# Patient Record
Sex: FEMALE | Race: BLACK OR AFRICAN AMERICAN | Employment: UNEMPLOYED | URBAN - METROPOLITAN AREA
[De-identification: names, ages, dates, MRNs, and addresses within clinical notes are randomized per-mention and may not be internally consistent; named-entity substitution may affect disease eponyms.]

---

## 2023-12-25 ENCOUNTER — HOSPITAL ENCOUNTER (EMERGENCY)
Age: 63
Discharge: HOME | End: 2023-12-26
Attending: EMERGENCY MEDICINE
Payer: MEDICARE

## 2023-12-25 VITALS
WEIGHT: 209.2 LBS | HEIGHT: 65 IN | SYSTOLIC BLOOD PRESSURE: 168 MMHG | TEMPERATURE: 98.2 F | BODY MASS INDEX: 34.85 KG/M2 | OXYGEN SATURATION: 97 % | RESPIRATION RATE: 20 BRPM | DIASTOLIC BLOOD PRESSURE: 108 MMHG | HEART RATE: 65 BPM

## 2023-12-25 DIAGNOSIS — I10 ASYMPTOMATIC HYPERTENSION: Primary | ICD-10-CM

## 2023-12-25 PROCEDURE — 6370000000 HC RX 637 (ALT 250 FOR IP): Performed by: STUDENT IN AN ORGANIZED HEALTH CARE EDUCATION/TRAINING PROGRAM

## 2023-12-25 PROCEDURE — 99283 EMERGENCY DEPT VISIT LOW MDM: CPT

## 2023-12-25 RX ORDER — ATENOLOL 25 MG/1
25 TABLET ORAL ONCE
Status: COMPLETED | OUTPATIENT
Start: 2023-12-25 | End: 2023-12-25

## 2023-12-25 RX ORDER — HYDROCHLOROTHIAZIDE 25 MG/1
25 TABLET ORAL ONCE
Status: COMPLETED | OUTPATIENT
Start: 2023-12-25 | End: 2023-12-25

## 2023-12-25 RX ORDER — ACETAMINOPHEN 325 MG/1
650 TABLET ORAL ONCE
Status: COMPLETED | OUTPATIENT
Start: 2023-12-25 | End: 2023-12-25

## 2023-12-25 RX ADMIN — HYDROCHLOROTHIAZIDE 25 MG: 25 TABLET ORAL at 22:20

## 2023-12-25 RX ADMIN — ACETAMINOPHEN 650 MG: 325 TABLET ORAL at 22:19

## 2023-12-25 RX ADMIN — ATENOLOL 25 MG: 25 TABLET ORAL at 22:20

## 2023-12-26 RX ORDER — HYDROCHLOROTHIAZIDE 25 MG/1
25 TABLET ORAL DAILY
Qty: 30 TABLET | Refills: 0 | Status: SHIPPED | OUTPATIENT
Start: 2023-12-26

## 2023-12-26 RX ORDER — ATENOLOL 25 MG/1
25 TABLET ORAL DAILY
Qty: 30 TABLET | Refills: 0 | Status: SHIPPED | OUTPATIENT
Start: 2023-12-26

## 2023-12-26 RX ORDER — HYDROCHLOROTHIAZIDE 25 MG/1
25 TABLET ORAL DAILY
Qty: 30 TABLET | Refills: 0 | Status: SHIPPED | OUTPATIENT
Start: 2023-12-26 | End: 2023-12-26

## 2023-12-26 NOTE — ED PROVIDER NOTES
[facility]          EM RESIDENT NOTE       Date of evaluation: 12/25/2023    Chief Complaint     Hypertension (Pt complains of hypertension. Pt has hx of hypertension but ran out of her meds two weeks ago. On arrival pt bp is 170/90. Pt complains of a headache starting two days ago.)    History of Present Illness     Chrissie Ann is a 70 y.o. female with PMHx of HTN, CAD, RA, chronic pain, and depression who presents with elevated blood pressures. Patient reports that she is been out of her antihypertensives and therefore has had elevated blood pressure readings for the past 1 to 2 weeks. She reports checking her blood pressures at home and they have been ranging anywhere between systolics of 401Q to 629F. Patient reports that when she is taking her antihypertensives, her blood pressure is very well-controlled with systolics in the 027X to 018P. Denies any associated symptoms including lightheadedness, dizziness, acute onset/worsening headaches, vision changes, cough, shortness of breath, chest pain or discomfort, palpitations, nausea, vomiting, abdominal pain, numbness or tingling in her extremities, or any focal weakness. Patient reports that she been having some insurance issues and therefore has missed out on her medications. Reports seeing her PCP a couple months ago. Review of Systems     A complete ROS was performed and is negative unless stated above. Past Medical, Surgical, Family, and Social History     She has a past medical history of CAD (coronary artery disease), Depression, DJD (degenerative joint disease), and Hypertension. She has a past surgical history that includes knee surgery; angioplasty; and Hysterectomy. Her family history is not on file. She reports that she has never smoked. She has never used smokeless tobacco. She reports current alcohol use. She reports that she does not use drugs.     Medications     Current Discharge Medication

## 2023-12-26 NOTE — DISCHARGE INSTRUCTIONS
You were seen in the emergency department for elevated blood pressures. As you were out of your home medications, you were given a dose of your home medications in the emergency department. As discussed, please take these medications as prescribed at home and please follow-up with your PCP. Return to the emergency department if you experience any of the following: Your blood pressure is much higher than normal (such as 180/120 or higher), but you don't have symptoms. You think high blood pressure is causing symptoms, such as:  Severe headache. Blurry vision.

## 2023-12-26 NOTE — ED PROVIDER NOTES
ED Attending Attestation Note     Date of evaluation: 12/25/2023    This patient was seen by the resident. I have seen and examined the patient, agree with the workup, evaluation, management and diagnosis. The care plan has been discussed. I have reviewed the ECG and concur with the resident's interpretation. My assessment reveals a 70 y.o. female with elevated blood pressure. Has very mild headache but otherwise asymptomatic. On exam she is well appearing, moves all four extremities and follows commands. Lungs are clear. Abdomen is soft and nontender. No indication for acute lowering of BP in ED. Will increase HCTZ dose and refer to PCP.          Gennett Leventhal, MD  12/26/23 1002

## 2024-12-27 ENCOUNTER — HOSPITAL ENCOUNTER (EMERGENCY)
Age: 64
Discharge: HOME | End: 2024-12-27
Attending: EMERGENCY MEDICINE
Payer: MEDICARE

## 2024-12-27 ENCOUNTER — APPOINTMENT (OUTPATIENT)
Dept: GENERAL RADIOLOGY | Age: 64
End: 2024-12-27
Payer: MEDICARE

## 2024-12-27 VITALS
SYSTOLIC BLOOD PRESSURE: 145 MMHG | HEIGHT: 65 IN | BODY MASS INDEX: 33.99 KG/M2 | TEMPERATURE: 98.8 F | OXYGEN SATURATION: 95 % | DIASTOLIC BLOOD PRESSURE: 88 MMHG | HEART RATE: 75 BPM | RESPIRATION RATE: 16 BRPM | WEIGHT: 204 LBS

## 2024-12-27 DIAGNOSIS — M79.662 PAIN OF LEFT CALF: Primary | ICD-10-CM

## 2024-12-27 LAB
AMORPH SED URNS QL MICRO: ABNORMAL /HPF
ANION GAP SERPL CALCULATED.3IONS-SCNC: 14 MMOL/L (ref 3–16)
BACTERIA URNS QL MICRO: ABNORMAL /HPF
BASOPHILS # BLD: 0 K/UL (ref 0–0.2)
BASOPHILS NFR BLD: 0.4 %
BILIRUB UR QL STRIP.AUTO: NEGATIVE
BUN SERPL-MCNC: 22 MG/DL (ref 7–20)
CALCIUM SERPL-MCNC: 10.3 MG/DL (ref 8.3–10.6)
CHLORIDE SERPL-SCNC: 100 MMOL/L (ref 99–110)
CLARITY UR: CLEAR
CO2 SERPL-SCNC: 24 MMOL/L (ref 21–32)
COLOR UR: YELLOW
CREAT SERPL-MCNC: 0.8 MG/DL (ref 0.6–1.2)
D-DIMER QUANTITATIVE: 2 UG/ML FEU (ref 0–0.6)
DEPRECATED RDW RBC AUTO: 13.8 % (ref 12.4–15.4)
EOSINOPHIL # BLD: 0.1 K/UL (ref 0–0.6)
EOSINOPHIL NFR BLD: 1.1 %
EPI CELLS #/AREA URNS HPF: ABNORMAL /HPF (ref 0–5)
GFR SERPLBLD CREATININE-BSD FMLA CKD-EPI: 81 ML/MIN/{1.73_M2}
GLUCOSE SERPL-MCNC: 117 MG/DL (ref 70–99)
GLUCOSE UR STRIP.AUTO-MCNC: NEGATIVE MG/DL
HCT VFR BLD AUTO: 39.9 % (ref 36–48)
HGB BLD-MCNC: 13.1 G/DL (ref 12–16)
HGB UR QL STRIP.AUTO: NEGATIVE
KETONES UR STRIP.AUTO-MCNC: NEGATIVE MG/DL
LEUKOCYTE ESTERASE UR QL STRIP.AUTO: ABNORMAL
LYMPHOCYTES # BLD: 2 K/UL (ref 1–5.1)
LYMPHOCYTES NFR BLD: 19.5 %
MAGNESIUM SERPL-MCNC: 2 MG/DL (ref 1.8–2.4)
MCH RBC QN AUTO: 29 PG (ref 26–34)
MCHC RBC AUTO-ENTMCNC: 32.9 G/DL (ref 31–36)
MCV RBC AUTO: 88 FL (ref 80–100)
MONOCYTES # BLD: 0.9 K/UL (ref 0–1.3)
MONOCYTES NFR BLD: 9.4 %
NEUTROPHILS # BLD: 7 K/UL (ref 1.7–7.7)
NEUTROPHILS NFR BLD: 69.6 %
NITRITE UR QL STRIP.AUTO: NEGATIVE
PH UR STRIP.AUTO: 6 [PH] (ref 5–8)
PLATELET # BLD AUTO: 255 K/UL (ref 135–450)
PMV BLD AUTO: 9.7 FL (ref 5–10.5)
POTASSIUM SERPL-SCNC: 3.2 MMOL/L (ref 3.5–5.1)
PROT UR STRIP.AUTO-MCNC: NEGATIVE MG/DL
RBC # BLD AUTO: 4.54 M/UL (ref 4–5.2)
RBC #/AREA URNS HPF: ABNORMAL /HPF (ref 0–4)
RENAL EPI CELLS #/AREA UR COMP ASSIST: ABNORMAL /HPF (ref 0–1)
SODIUM SERPL-SCNC: 138 MMOL/L (ref 136–145)
SP GR UR STRIP.AUTO: 1.01 (ref 1–1.03)
UA COMPLETE W REFLEX CULTURE PNL UR: ABNORMAL
UA DIPSTICK W REFLEX MICRO PNL UR: YES
URN SPEC COLLECT METH UR: ABNORMAL
UROBILINOGEN UR STRIP-ACNC: 0.2 E.U./DL
WBC # BLD AUTO: 10 K/UL (ref 4–11)
WBC #/AREA URNS HPF: ABNORMAL /HPF (ref 0–5)

## 2024-12-27 PROCEDURE — 81001 URINALYSIS AUTO W/SCOPE: CPT

## 2024-12-27 PROCEDURE — 85025 COMPLETE CBC W/AUTO DIFF WBC: CPT

## 2024-12-27 PROCEDURE — 83735 ASSAY OF MAGNESIUM: CPT

## 2024-12-27 PROCEDURE — 6370000000 HC RX 637 (ALT 250 FOR IP)

## 2024-12-27 PROCEDURE — 85379 FIBRIN DEGRADATION QUANT: CPT

## 2024-12-27 PROCEDURE — 80048 BASIC METABOLIC PNL TOTAL CA: CPT

## 2024-12-27 PROCEDURE — 99284 EMERGENCY DEPT VISIT MOD MDM: CPT

## 2024-12-27 PROCEDURE — 73562 X-RAY EXAM OF KNEE 3: CPT

## 2024-12-27 RX ORDER — POTASSIUM CHLORIDE 1500 MG/1
40 TABLET, EXTENDED RELEASE ORAL ONCE
Status: COMPLETED | OUTPATIENT
Start: 2024-12-27 | End: 2024-12-27

## 2024-12-27 RX ORDER — IBUPROFEN 400 MG/1
800 TABLET, FILM COATED ORAL ONCE
Status: COMPLETED | OUTPATIENT
Start: 2024-12-27 | End: 2024-12-27

## 2024-12-27 RX ORDER — ACETAMINOPHEN 500 MG
1000 TABLET ORAL
Status: COMPLETED | OUTPATIENT
Start: 2024-12-27 | End: 2024-12-27

## 2024-12-27 RX ADMIN — POTASSIUM CHLORIDE 40 MEQ: 1500 TABLET, EXTENDED RELEASE ORAL at 23:34

## 2024-12-27 RX ADMIN — IBUPROFEN 800 MG: 400 TABLET, FILM COATED ORAL at 21:01

## 2024-12-27 RX ADMIN — ACETAMINOPHEN 1000 MG: 500 TABLET, FILM COATED ORAL at 21:01

## 2024-12-27 RX ADMIN — APIXABAN 10 MG: 5 TABLET, FILM COATED ORAL at 23:35

## 2024-12-27 ASSESSMENT — PAIN - FUNCTIONAL ASSESSMENT: PAIN_FUNCTIONAL_ASSESSMENT: 0-10

## 2024-12-27 ASSESSMENT — PAIN SCALES - GENERAL: PAINLEVEL_OUTOF10: 8

## 2024-12-27 ASSESSMENT — PAIN DESCRIPTION - LOCATION: LOCATION: LEG

## 2024-12-27 ASSESSMENT — LIFESTYLE VARIABLES
HOW OFTEN DO YOU HAVE A DRINK CONTAINING ALCOHOL: NEVER
HOW MANY STANDARD DRINKS CONTAINING ALCOHOL DO YOU HAVE ON A TYPICAL DAY: PATIENT DOES NOT DRINK

## 2024-12-27 ASSESSMENT — PAIN DESCRIPTION - ORIENTATION: ORIENTATION: LEFT

## 2024-12-28 NOTE — ED PROVIDER NOTES
ED Attending Attestation Note     Date of evaluation: 12/27/2024    This patient was seen by the resident.  I have seen and examined the patient, agree with the workup, evaluation, management and diagnosis. The care plan has been discussed.  I have reviewed the ECG and concur with the resident's interpretation.  My assessment reveals an overall well-appearing older patient, pleasantly conversational, in no acute distress.  She presents to the emergency department reportedly at the behest of her PCPs office due to concern for symptoms potentially caused by a UTI.  Patient states that over the past 2 days or so, she has had significant pain that she describes as being across her low lumbar/sacral back, somewhat worse on the left than the right, radiating somewhat into the left buttock and thigh on the left.  In that same time.  She has also been experiencing pain and swelling of the left knee.  She does not recall any antecedent strain or trauma.  The pain in her back and knee have been making it difficult for her to get around, although she is still able to ambulate with the assistance of a cane, she states that she is moving a good bit slower than is typical for her.  As a result, she states that she has been having difficulty getting to the bathroom in time when she feels the urge to urinate, and has urinated on herself as a result.  She states that she does strongly feel the urge to urinate, just feels that she is not able to get to the bathroom in time.  Feels that she is urinating with some greater frequency, although denies hesitancy, unusual urgency, or any dysuria, or abdominal pain.  On examination, patient does have diffuse discomfort to palpation across the low lumbar back, somewhat more prominent in the left paraspinous region and left buttock.  No CVA tenderness to percussion.  No abdominal tenderness.  On examination of the left leg, the patient is noted to have anterior bilateral scars of total knee

## 2024-12-28 NOTE — ED PROVIDER NOTES
THE [facility]  EMERGENCY DEPARTMENT ENCOUNTER          EM RESIDENT NOTE       Date of evaluation: 12/27/2024    Chief Complaint     Flank Pain, Leg Pain, and Back Pain      History of Present Illness     Zulay Louis is a 64 y.o. female with a past medical history HTN, CAD, bilateral  TKA who presents with left knee pain and urinary urgency and urge incontinence for the last 2-3 days. She also complains of bilateral low back pain that is worse with position. She notes that her left knee has been swollen and painful for a similar period with new swelling in her left calf and left calf pain. She denies any fevers, dysuria, polyuria, or change in appearance of her urine. Denies saddle incontinence or numbness/weakness in any extremity. She states she knows she has to go but just cannot make it to the bathroom in time. She denies any known history of DVT/PE or recent travel.    Other than stated above, no additional associated symptoms or aggravating/alleviating factors are noted.    MEDICAL DECISION MAKING / ASSESSMENT / PLAN     INITIAL VITALS: BP: (!) 145/88, Temp: 98.8 °F (37.1 °C), Pulse: 75, Respirations: 16, SpO2: 95 %    Nursing Notes, Past Medical Hx, Past Surgical Hx, Social Hx, Allergies, and Family Hx were reviewed.    Upon presentation, the patient was non-toxic appearing resting comfortably in no acute distress. Vital signs are reassuring, hemodynamically stable, not tachycardic or tachypneic, normoxic on room air, afebrile. On exam, she has bilateral lumbar paraspinal TTP without any midline TTP. No CVAT. No neurologic deficits on exam. Her left knee is swollen with joint line tenderness and the swelling extends to her left calf which is slightly larger than the right. There is no warmth, erythema, fluctuance, or induration. Her left calf is tender to palpation without any palpable cords. She has pain with flexion past 30 degrees but has no pain with passive flexion or extension from fully

## 2024-12-28 NOTE — DISCHARGE INSTRUCTIONS
There is no evidence of a urinary tract infection. We will send a urine culture and call you if it is abnormal, but right now it does not need treatment. Monitor for any UTI symptoms such as pain when urinating or fevers.    We are starting you on a blood thinner for a possible blood clot in your left leg. We will obtain an ultrasound to evaluate this as an outpatient. If the ultrasound is negative, you can stop the blood thinner. I am only prescribing a 4 day course of this so if there is a blood clot you will need a longer prescription.    If you have been told you need a Doppler Test to look for a blood clot:    Contact the Scheduling Department on the next business day at [phone] to schedule your vascular study. Let the  know you were seen in the Emergency Room.  You must bring a copy of your ordered exam with you to the Hospital for your scheduled appointment.  Scheduling will contact the vascular department if there are any questions or issues.

## 2025-01-27 ENCOUNTER — HOSPITAL ENCOUNTER (OUTPATIENT)
Dept: VASCULAR LAB | Age: 65
Discharge: HOME | End: 2025-01-29
Payer: MEDICARE

## 2025-01-27 DIAGNOSIS — M79.662 PAIN OF LEFT CALF: ICD-10-CM

## 2025-01-27 PROCEDURE — 93971 EXTREMITY STUDY: CPT | Performed by: SURGERY

## 2025-01-27 PROCEDURE — 93971 EXTREMITY STUDY: CPT

## 2025-03-18 ENCOUNTER — HOSPITAL ENCOUNTER (INPATIENT)
Age: 65
LOS: 4 days | Discharge: HOME | End: 2025-03-22
Attending: EMERGENCY MEDICINE | Admitting: INTERNAL MEDICINE
Payer: MEDICARE

## 2025-03-18 ENCOUNTER — APPOINTMENT (OUTPATIENT)
Dept: GENERAL RADIOLOGY | Age: 65
End: 2025-03-18
Payer: MEDICARE

## 2025-03-18 ENCOUNTER — APPOINTMENT (OUTPATIENT)
Dept: CT IMAGING | Age: 65
End: 2025-03-18
Payer: MEDICARE

## 2025-03-18 DIAGNOSIS — E87.6 HYPOKALEMIA: ICD-10-CM

## 2025-03-18 DIAGNOSIS — R11.2 NAUSEA AND VOMITING, UNSPECIFIED VOMITING TYPE: Primary | ICD-10-CM

## 2025-03-18 DIAGNOSIS — I95.9 HYPOTENSION, UNSPECIFIED HYPOTENSION TYPE: ICD-10-CM

## 2025-03-18 LAB
ALBUMIN SERPL-MCNC: 3.7 G/DL (ref 3.4–5)
ALBUMIN/GLOB SERPL: 0.9 {RATIO} (ref 1.1–2.2)
ALP SERPL-CCNC: 88 U/L (ref 40–129)
ALT SERPL-CCNC: 7 U/L (ref 10–40)
ANION GAP SERPL CALCULATED.3IONS-SCNC: 22 MMOL/L (ref 3–16)
ANION GAP SERPL CALCULATED.3IONS-SCNC: 23 MMOL/L (ref 3–16)
AST SERPL-CCNC: 29 U/L (ref 15–37)
BASOPHILS # BLD: 0.1 K/UL (ref 0–0.2)
BASOPHILS NFR BLD: 0.8 %
BILIRUB SERPL-MCNC: 0.8 MG/DL (ref 0–1)
BILIRUB UR QL STRIP.AUTO: ABNORMAL
BUN SERPL-MCNC: 12 MG/DL (ref 7–20)
BUN SERPL-MCNC: 12 MG/DL (ref 7–20)
CALCIUM SERPL-MCNC: 10.4 MG/DL (ref 8.3–10.6)
CALCIUM SERPL-MCNC: 11.1 MG/DL (ref 8.3–10.6)
CHLORIDE SERPL-SCNC: 92 MMOL/L (ref 99–110)
CHLORIDE SERPL-SCNC: 93 MMOL/L (ref 99–110)
CLARITY UR: CLEAR
CO2 SERPL-SCNC: 24 MMOL/L (ref 21–32)
CO2 SERPL-SCNC: 24 MMOL/L (ref 21–32)
COLOR UR: YELLOW
CREAT SERPL-MCNC: 1 MG/DL (ref 0.6–1.2)
CREAT SERPL-MCNC: 1 MG/DL (ref 0.6–1.2)
DEPRECATED RDW RBC AUTO: 15.7 % (ref 12.4–15.4)
EKG ATRIAL RATE: 57 BPM
EKG DIAGNOSIS: NORMAL
EKG P AXIS: 50 DEGREES
EKG P-R INTERVAL: 172 MS
EKG Q-T INTERVAL: 486 MS
EKG QRS DURATION: 92 MS
EKG QTC CALCULATION (BAZETT): 473 MS
EKG R AXIS: 23 DEGREES
EKG T AXIS: -26 DEGREES
EKG VENTRICULAR RATE: 57 BPM
EOSINOPHIL # BLD: 0.1 K/UL (ref 0–0.6)
EOSINOPHIL NFR BLD: 1.3 %
FLUAV RNA RESP QL NAA+PROBE: NOT DETECTED
FLUBV RNA RESP QL NAA+PROBE: NOT DETECTED
GFR SERPLBLD CREATININE-BSD FMLA CKD-EPI: 62 ML/MIN/{1.73_M2}
GFR SERPLBLD CREATININE-BSD FMLA CKD-EPI: 62 ML/MIN/{1.73_M2}
GLUCOSE SERPL-MCNC: 81 MG/DL (ref 70–99)
GLUCOSE SERPL-MCNC: 82 MG/DL (ref 70–99)
GLUCOSE UR STRIP.AUTO-MCNC: NEGATIVE MG/DL
HCT VFR BLD AUTO: 45.1 % (ref 36–48)
HGB BLD-MCNC: 14.9 G/DL (ref 12–16)
HGB UR QL STRIP.AUTO: NEGATIVE
KETONES UR STRIP.AUTO-MCNC: 40 MG/DL
LACTATE BLDV-SCNC: 1 MMOL/L (ref 0.4–2)
LEUKOCYTE ESTERASE UR QL STRIP.AUTO: NEGATIVE
LIPASE SERPL-CCNC: 22 U/L (ref 13–60)
LYMPHOCYTES # BLD: 2.6 K/UL (ref 1–5.1)
LYMPHOCYTES NFR BLD: 30.4 %
MAGNESIUM SERPL-MCNC: 1.97 MG/DL (ref 1.8–2.4)
MAGNESIUM SERPL-MCNC: 2.07 MG/DL (ref 1.8–2.4)
MCH RBC QN AUTO: 27.9 PG (ref 26–34)
MCHC RBC AUTO-ENTMCNC: 32.9 G/DL (ref 31–36)
MCV RBC AUTO: 84.7 FL (ref 80–100)
MONOCYTES # BLD: 0.6 K/UL (ref 0–1.3)
MONOCYTES NFR BLD: 7.2 %
NEUTROPHILS # BLD: 5.1 K/UL (ref 1.7–7.7)
NEUTROPHILS NFR BLD: 60.3 %
NITRITE UR QL STRIP.AUTO: NEGATIVE
NT-PROBNP SERPL-MCNC: 217 PG/ML (ref 0–124)
PH UR STRIP.AUTO: 6.5 [PH] (ref 5–8)
PLATELET # BLD AUTO: 310 K/UL (ref 135–450)
PMV BLD AUTO: 9.2 FL (ref 5–10.5)
POTASSIUM SERPL-SCNC: 2.5 MMOL/L (ref 3.5–5.1)
POTASSIUM SERPL-SCNC: 2.5 MMOL/L (ref 3.5–5.1)
POTASSIUM SERPL-SCNC: 2.7 MMOL/L (ref 3.5–5.1)
PROT SERPL-MCNC: 7.7 G/DL (ref 6.4–8.2)
PROT UR STRIP.AUTO-MCNC: NEGATIVE MG/DL
RBC # BLD AUTO: 5.33 M/UL (ref 4–5.2)
SARS-COV-2 RNA RESP QL NAA+PROBE: NOT DETECTED
SODIUM SERPL-SCNC: 139 MMOL/L (ref 136–145)
SODIUM SERPL-SCNC: 139 MMOL/L (ref 136–145)
SP GR UR STRIP.AUTO: 1.01 (ref 1–1.03)
TROPONIN, HIGH SENSITIVITY: 17 NG/L (ref 0–14)
TROPONIN, HIGH SENSITIVITY: 20 NG/L (ref 0–14)
UA COMPLETE W REFLEX CULTURE PNL UR: ABNORMAL
UA DIPSTICK W REFLEX MICRO PNL UR: ABNORMAL
URN SPEC COLLECT METH UR: ABNORMAL
UROBILINOGEN UR STRIP-ACNC: 0.2 E.U./DL
WBC # BLD AUTO: 8.4 K/UL (ref 4–11)

## 2025-03-18 PROCEDURE — 83605 ASSAY OF LACTIC ACID: CPT

## 2025-03-18 PROCEDURE — 2580000003 HC RX 258

## 2025-03-18 PROCEDURE — 2060000000 HC ICU INTERMEDIATE R&B

## 2025-03-18 PROCEDURE — 84132 ASSAY OF SERUM POTASSIUM: CPT

## 2025-03-18 PROCEDURE — 80053 COMPREHEN METABOLIC PANEL: CPT

## 2025-03-18 PROCEDURE — 6360000002 HC RX W HCPCS

## 2025-03-18 PROCEDURE — 85025 COMPLETE CBC W/AUTO DIFF WBC: CPT

## 2025-03-18 PROCEDURE — 71046 X-RAY EXAM CHEST 2 VIEWS: CPT

## 2025-03-18 PROCEDURE — 81003 URINALYSIS AUTO W/O SCOPE: CPT

## 2025-03-18 PROCEDURE — 96366 THER/PROPH/DIAG IV INF ADDON: CPT

## 2025-03-18 PROCEDURE — 84484 ASSAY OF TROPONIN QUANT: CPT

## 2025-03-18 PROCEDURE — 36415 COLL VENOUS BLD VENIPUNCTURE: CPT

## 2025-03-18 PROCEDURE — 1200000000 HC SEMI PRIVATE

## 2025-03-18 PROCEDURE — 87636 SARSCOV2 & INF A&B AMP PRB: CPT

## 2025-03-18 PROCEDURE — 99285 EMERGENCY DEPT VISIT HI MDM: CPT

## 2025-03-18 PROCEDURE — 83880 ASSAY OF NATRIURETIC PEPTIDE: CPT

## 2025-03-18 PROCEDURE — 74177 CT ABD & PELVIS W/CONTRAST: CPT

## 2025-03-18 PROCEDURE — 93005 ELECTROCARDIOGRAM TRACING: CPT

## 2025-03-18 PROCEDURE — 96375 TX/PRO/DX INJ NEW DRUG ADDON: CPT

## 2025-03-18 PROCEDURE — 6360000004 HC RX CONTRAST MEDICATION: Performed by: PHYSICIAN ASSISTANT

## 2025-03-18 PROCEDURE — 83690 ASSAY OF LIPASE: CPT

## 2025-03-18 PROCEDURE — 83735 ASSAY OF MAGNESIUM: CPT

## 2025-03-18 PROCEDURE — 96365 THER/PROPH/DIAG IV INF INIT: CPT

## 2025-03-18 RX ORDER — POTASSIUM CHLORIDE 7.45 MG/ML
10 INJECTION INTRAVENOUS
Status: COMPLETED | OUTPATIENT
Start: 2025-03-18 | End: 2025-03-18

## 2025-03-18 RX ORDER — SODIUM CHLORIDE, SODIUM LACTATE, POTASSIUM CHLORIDE, AND CALCIUM CHLORIDE .6; .31; .03; .02 G/100ML; G/100ML; G/100ML; G/100ML
1000 INJECTION, SOLUTION INTRAVENOUS ONCE
Status: COMPLETED | OUTPATIENT
Start: 2025-03-18 | End: 2025-03-18

## 2025-03-18 RX ORDER — GABAPENTIN 800 MG/1
1 TABLET ORAL 3 TIMES DAILY
Status: ON HOLD | COMMUNITY
End: 2025-03-22

## 2025-03-18 RX ORDER — IOPAMIDOL 755 MG/ML
75 INJECTION, SOLUTION INTRAVASCULAR
Status: COMPLETED | OUTPATIENT
Start: 2025-03-18 | End: 2025-03-18

## 2025-03-18 RX ORDER — ONDANSETRON 2 MG/ML
4 INJECTION INTRAMUSCULAR; INTRAVENOUS ONCE
Status: COMPLETED | OUTPATIENT
Start: 2025-03-18 | End: 2025-03-18

## 2025-03-18 RX ADMIN — POTASSIUM CHLORIDE 10 MEQ: 7.45 INJECTION INTRAVENOUS at 17:39

## 2025-03-18 RX ADMIN — ONDANSETRON 4 MG: 2 INJECTION, SOLUTION INTRAMUSCULAR; INTRAVENOUS at 17:36

## 2025-03-18 RX ADMIN — POTASSIUM CHLORIDE 10 MEQ: 7.45 INJECTION INTRAVENOUS at 20:45

## 2025-03-18 RX ADMIN — SODIUM CHLORIDE, SODIUM LACTATE, POTASSIUM CHLORIDE, AND CALCIUM CHLORIDE 1000 ML: .6; .31; .03; .02 INJECTION, SOLUTION INTRAVENOUS at 17:36

## 2025-03-18 RX ADMIN — POTASSIUM CHLORIDE 10 MEQ: 7.45 INJECTION INTRAVENOUS at 21:49

## 2025-03-18 RX ADMIN — IOPAMIDOL 75 ML: 755 INJECTION, SOLUTION INTRAVENOUS at 19:37

## 2025-03-18 RX ADMIN — POTASSIUM CHLORIDE 10 MEQ: 7.45 INJECTION INTRAVENOUS at 19:04

## 2025-03-18 ASSESSMENT — PAIN SCALES - GENERAL: PAINLEVEL_OUTOF10: 0

## 2025-03-18 NOTE — ED PROVIDER NOTES
THE [facility]  EMERGENCY DEPARTMENT ENCOUNTER          EM RESIDENT NOTE       Date of evaluation: 3/18/2025    Chief Complaint     Nausea and Vomiting (Pt. Presents to ED via EMS with c/o nausea and vomiting for past three weeks. )      History of Present Illness     Zulay Louis is a 64 y.o. female who presents for nausea and vomiting.  Patient with history of hypertension, CAD, depression.  Patient states she has been vomiting up any food for the last month, has been able to keep some water down.  Also states she lost consciousness earlier today which caused her send to call 911 and bring her to the ED for evaluation.  Denies hitting her head or headache at this time.  Denies blood thinner use.  Patient states she is lost a significant amount of weight and has had significant fatigue, also endorses shortness of breath especially with exertion.  Denies any other episodes of LOC, chest pain, abdominal pain, dysuria, diarrhea.    MEDICAL DECISION MAKING / ASSESSMENT / PLAN     INITIAL VITALS: BP: 110/79, Temp: 98.1 °F (36.7 °C), Pulse: 82, Respirations: 18, SpO2: 99 %    Zulay Louis is a 64 y.o. female with history of CAD and hypertension presenting with 1 month of nausea and vomiting and episode of syncope.  CBC without leukocytosis or anemia.  CMP with hypokalemia 2.7 though specimen was grossly hemolyzed, also concerning for anion gap of 23 without significant metabolic acidosis, kidney function normal and no concerning findings on liver panel.  Repeat BMP with potassium of 2.5, still hemolyzed.  Will proceed with repletion with IV potassium and repeat potassium again.  Lactate normal at 1.  BNP mildly elevated to 217.  Troponin 20, repeat 17 with no concerning EKG findings.  Patient given 1 L fluid bolus and Zofran for symptom management.  Chest x-ray without acute findings.  On reassessment continues to be hemodynamically stable.  Patient was signed out in stable condition to [facility]

## 2025-03-18 NOTE — ED PROVIDER NOTES
THE [facility]  EMERGENCY DEPARTMENT ENCOUNTER          PHYSICIAN ASSISTANT NOTE     Date of evaluation: 3/18/2025    ADDENDUM:      Care of this patient was assumed from Dr. Rincon.  The patient was seen for Nausea and Vomiting (Pt. Presents to ED via EMS with c/o nausea and vomiting for past three weeks. )  .  The patient's initial evaluation and plan have been discussed with the prior provider who initially evaluated the patient.  Nursing Notes, Past Medical Hx, Past Surgical Hx, Social Hx, Allergies, and Family Hx were all reviewed.    ASSESSMENT / PLAN  (MEDICAL DECISION MAKING)     Zulay Louis is a 64 y.o. female with PMH of CAD, HTN who presented to the ED with complaints of intermittent vomiting x 1 month with minimal PO intake, fatigue, weight loss. Patient also with reported LOC today where she slid down into chair. She has had some dyspnea on exertion, no chest pain. EKG non-ischemic. Labs notable for unremarkable CBC, lactic acid 1.0, BMP with creatinine 1.0, K 2.5 (but hemolyzed so likely falsely elevated), AG of 23 w/o significant metabolic acidosis, Mg 1.97, normal LFTs, hsT 20, . COVID/influenza negative. Patient was given 1L bolus of IVF, zofran and IV potassium repletion initiated. Repeat potassium pending.    At time of shift change, the following are pending:  Repeat potassium  Potassium repletion (40mEq IV potassium ordered)  CT a/p  Admission    CT a/p revealed no acute findings. Plan to admit for further evaluation and management.    Is this patient to be included in the SEP-1 core measure? No Exclusion criteria - the patient is NOT to be included for SEP-1 Core Measure due to: Infection is not suspected    Medical Decision Making  Problems Addressed:  Hypokalemia: acute illness or injury  Nausea and vomiting, unspecified vomiting type: acute illness or injury    Amount and/or Complexity of Data Reviewed  Labs: ordered. Decision-making details documented in ED  Course.  Radiology: ordered. Decision-making details documented in ED Course.  ECG/medicine tests: ordered. Decision-making details documented in ED Course.    Risk  Prescription drug management.  Decision regarding hospitalization.        This patient was also evaluated by the attending physician. All care plans were discussed and agreed upon.    Clinical Impression     1. Nausea and vomiting, unspecified vomiting type    2. Hypokalemia        Disposition     PATIENT REFERRED TO:  No follow-up provider specified.    DISCHARGE MEDICATIONS:  New Prescriptions    No medications on file       DISPOSITION Decision To Admit 03/18/2025 08:25:30 PM   DISPOSITION CONDITION Stable             Diagnostic Results and Other Data     RADIOLOGY:  CT ABDOMEN PELVIS W IV CONTRAST Additional Contrast? None   Final Result      No acute abdominopelvic abnormality.            Electronically signed by Matheus Snyder MD      XR CHEST (2 VW)   Final Result      1. No acute disease.             Electronically signed by Yonatan Mejia MD          LABS:   See Medical Record      RECENT VITALS:  BP: 92/60, Temp: 98.1 °F (36.7 °C), Pulse: 58, Respirations: 13, SpO2: 97 %     Procedures         ED Course          The patient was given the following medications:  Orders Placed This Encounter   Medications    lactated ringers bolus 1,000 mL    ondansetron (ZOFRAN) injection 4 mg    potassium chloride 10 mEq/100 mL IVPB (Peripheral Line)    iopamidol (ISOVUE-370) 76 % injection 75 mL       CONSULTS:  None         Sandy Jeff PA-C  03/18/25 2040

## 2025-03-18 NOTE — ED PROVIDER NOTES
ED Attending Attestation Note     Date of evaluation: 3/18/2025    This patient was seen by the advance practice provider.  I have seen and examined the patient, agree with the workup, evaluation, management and diagnosis. The care plan has been discussed.  I reviewed the EKG and agree with the interpretation as documented by the resident physician.    Briefly, Zulay Louis is a 64 y.o. female with a PMH inclusive of HTN, CAD who presents for evaluation of poor appetite and vomiting for the last one month. Had syncopal episodes today. Has not seen anyone for this.     Notable exam findings include no focal abdominal tenderness.    Assessment/ Medical Decision Making:     Patient with profound hypokalemia, likely secondary to vomiting and decreased p.o. intake over the last couple of months.  She will require admission.       Shalom Payton MD  03/18/25 1145

## 2025-03-19 ENCOUNTER — APPOINTMENT (OUTPATIENT)
Dept: ENDOSCOPY | Age: 65
End: 2025-03-19
Attending: INTERNAL MEDICINE
Payer: MEDICARE

## 2025-03-19 ENCOUNTER — ANESTHESIA (OUTPATIENT)
Dept: ENDOSCOPY | Age: 65
End: 2025-03-19
Payer: MEDICARE

## 2025-03-19 ENCOUNTER — ANESTHESIA EVENT (OUTPATIENT)
Dept: ENDOSCOPY | Age: 65
End: 2025-03-19
Payer: MEDICARE

## 2025-03-19 PROBLEM — E87.29 HIGH ANION GAP METABOLIC ACIDOSIS: Status: ACTIVE | Noted: 2025-03-19

## 2025-03-19 PROBLEM — K29.00 ACUTE GASTRITIS WITHOUT HEMORRHAGE: Status: ACTIVE | Noted: 2025-03-19

## 2025-03-19 PROBLEM — E83.39 HYPOPHOSPHATEMIA: Status: ACTIVE | Noted: 2025-03-19

## 2025-03-19 PROBLEM — K22.2 PEPTIC STRICTURE OF ESOPHAGUS: Status: ACTIVE | Noted: 2025-03-19

## 2025-03-19 LAB
ALBUMIN SERPL-MCNC: 2.9 G/DL (ref 3.4–5)
ANION GAP SERPL CALCULATED.3IONS-SCNC: 10 MMOL/L (ref 3–16)
ANION GAP SERPL CALCULATED.3IONS-SCNC: 17 MMOL/L (ref 3–16)
BETA-HYDROXYBUTYRATE: 5 MMOL/L (ref 0–0.27)
BUN SERPL-MCNC: 11 MG/DL (ref 7–20)
BUN SERPL-MCNC: 7 MG/DL (ref 7–20)
CALCIUM SERPL-MCNC: 9.7 MG/DL (ref 8.3–10.6)
CALCIUM SERPL-MCNC: 9.8 MG/DL (ref 8.3–10.6)
CHLORIDE SERPL-SCNC: 102 MMOL/L (ref 99–110)
CHLORIDE SERPL-SCNC: 98 MMOL/L (ref 99–110)
CO2 SERPL-SCNC: 23 MMOL/L (ref 21–32)
CO2 SERPL-SCNC: 27 MMOL/L (ref 21–32)
CREAT SERPL-MCNC: 0.9 MG/DL (ref 0.6–1.2)
CREAT SERPL-MCNC: 0.9 MG/DL (ref 0.6–1.2)
GFR SERPLBLD CREATININE-BSD FMLA CKD-EPI: 70 ML/MIN/{1.73_M2}
GFR SERPLBLD CREATININE-BSD FMLA CKD-EPI: 70 ML/MIN/{1.73_M2}
GLUCOSE SERPL-MCNC: 133 MG/DL (ref 70–99)
GLUCOSE SERPL-MCNC: 77 MG/DL (ref 70–99)
MAGNESIUM SERPL-MCNC: 1.75 MG/DL (ref 1.8–2.4)
MAGNESIUM SERPL-MCNC: 1.87 MG/DL (ref 1.8–2.4)
PHOSPHATE SERPL-MCNC: 1.4 MG/DL (ref 2.5–4.9)
PHOSPHATE SERPL-MCNC: 2.1 MG/DL (ref 2.5–4.9)
POTASSIUM SERPL-SCNC: 2.9 MMOL/L (ref 3.5–5.1)
POTASSIUM SERPL-SCNC: 3.1 MMOL/L (ref 3.5–5.1)
POTASSIUM SERPL-SCNC: 3.2 MMOL/L (ref 3.5–5.1)
SODIUM SERPL-SCNC: 138 MMOL/L (ref 136–145)
SODIUM SERPL-SCNC: 139 MMOL/L (ref 136–145)

## 2025-03-19 PROCEDURE — 84100 ASSAY OF PHOSPHORUS: CPT

## 2025-03-19 PROCEDURE — 7100000010 HC PHASE II RECOVERY - FIRST 15 MIN: Performed by: INTERNAL MEDICINE

## 2025-03-19 PROCEDURE — 84134 ASSAY OF PREALBUMIN: CPT

## 2025-03-19 PROCEDURE — 0D758ZZ DILATION OF ESOPHAGUS, VIA NATURAL OR ARTIFICIAL OPENING ENDOSCOPIC: ICD-10-PCS | Performed by: INTERNAL MEDICINE

## 2025-03-19 PROCEDURE — 36415 COLL VENOUS BLD VENIPUNCTURE: CPT

## 2025-03-19 PROCEDURE — 3700000000 HC ANESTHESIA ATTENDED CARE: Performed by: INTERNAL MEDICINE

## 2025-03-19 PROCEDURE — 6360000002 HC RX W HCPCS: Performed by: INTERNAL MEDICINE

## 2025-03-19 PROCEDURE — 3700000001 HC ADD 15 MINUTES (ANESTHESIA): Performed by: INTERNAL MEDICINE

## 2025-03-19 PROCEDURE — 84132 ASSAY OF SERUM POTASSIUM: CPT

## 2025-03-19 PROCEDURE — 6370000000 HC RX 637 (ALT 250 FOR IP): Performed by: INTERNAL MEDICINE

## 2025-03-19 PROCEDURE — 7100000011 HC PHASE II RECOVERY - ADDTL 15 MIN: Performed by: INTERNAL MEDICINE

## 2025-03-19 PROCEDURE — 2500000003 HC RX 250 WO HCPCS: Performed by: INTERNAL MEDICINE

## 2025-03-19 PROCEDURE — 6370000000 HC RX 637 (ALT 250 FOR IP): Performed by: NURSE PRACTITIONER

## 2025-03-19 PROCEDURE — 2580000003 HC RX 258: Performed by: INTERNAL MEDICINE

## 2025-03-19 PROCEDURE — 82010 KETONE BODYS QUAN: CPT

## 2025-03-19 PROCEDURE — 0DB68ZX EXCISION OF STOMACH, VIA NATURAL OR ARTIFICIAL OPENING ENDOSCOPIC, DIAGNOSTIC: ICD-10-PCS | Performed by: INTERNAL MEDICINE

## 2025-03-19 PROCEDURE — 3609017100 HC EGD: Performed by: INTERNAL MEDICINE

## 2025-03-19 PROCEDURE — 88305 TISSUE EXAM BY PATHOLOGIST: CPT

## 2025-03-19 PROCEDURE — 2709999900 HC NON-CHARGEABLE SUPPLY: Performed by: INTERNAL MEDICINE

## 2025-03-19 PROCEDURE — 2580000003 HC RX 258

## 2025-03-19 PROCEDURE — 2060000000 HC ICU INTERMEDIATE R&B

## 2025-03-19 PROCEDURE — C1726 CATH, BAL DIL, NON-VASCULAR: HCPCS | Performed by: INTERNAL MEDICINE

## 2025-03-19 PROCEDURE — 6360000002 HC RX W HCPCS

## 2025-03-19 PROCEDURE — 80069 RENAL FUNCTION PANEL: CPT

## 2025-03-19 PROCEDURE — 83735 ASSAY OF MAGNESIUM: CPT

## 2025-03-19 RX ORDER — SODIUM CHLORIDE, SODIUM LACTATE, POTASSIUM CHLORIDE, CALCIUM CHLORIDE 600; 310; 30; 20 MG/100ML; MG/100ML; MG/100ML; MG/100ML
INJECTION, SOLUTION INTRAVENOUS ONCE
Status: COMPLETED | OUTPATIENT
Start: 2025-03-19 | End: 2025-03-19

## 2025-03-19 RX ORDER — SODIUM CHLORIDE, SODIUM LACTATE, POTASSIUM CHLORIDE, CALCIUM CHLORIDE 600; 310; 30; 20 MG/100ML; MG/100ML; MG/100ML; MG/100ML
INJECTION, SOLUTION INTRAVENOUS
Status: DISCONTINUED | OUTPATIENT
Start: 2025-03-19 | End: 2025-03-19 | Stop reason: SDUPTHER

## 2025-03-19 RX ORDER — GABAPENTIN 400 MG/1
800 CAPSULE ORAL 3 TIMES DAILY
Status: DISCONTINUED | OUTPATIENT
Start: 2025-03-19 | End: 2025-03-20

## 2025-03-19 RX ORDER — MAGNESIUM SULFATE IN WATER 40 MG/ML
2000 INJECTION, SOLUTION INTRAVENOUS PRN
Status: DISCONTINUED | OUTPATIENT
Start: 2025-03-19 | End: 2025-03-22 | Stop reason: HOSPADM

## 2025-03-19 RX ORDER — LOSARTAN POTASSIUM 25 MG/1
25 TABLET ORAL DAILY
Status: DISCONTINUED | OUTPATIENT
Start: 2025-03-19 | End: 2025-03-19

## 2025-03-19 RX ORDER — ASPIRIN 81 MG/1
81 TABLET ORAL DAILY
Status: DISCONTINUED | OUTPATIENT
Start: 2025-03-19 | End: 2025-03-20

## 2025-03-19 RX ORDER — MULTIVITAMIN WITH IRON
1 TABLET ORAL DAILY
Status: DISCONTINUED | OUTPATIENT
Start: 2025-03-20 | End: 2025-03-22 | Stop reason: HOSPADM

## 2025-03-19 RX ORDER — POTASSIUM CHLORIDE 7.45 MG/ML
10 INJECTION INTRAVENOUS
Status: DISCONTINUED | OUTPATIENT
Start: 2025-03-19 | End: 2025-03-19

## 2025-03-19 RX ORDER — ACETAMINOPHEN 325 MG/1
650 TABLET ORAL EVERY 6 HOURS PRN
Status: DISCONTINUED | OUTPATIENT
Start: 2025-03-19 | End: 2025-03-22 | Stop reason: HOSPADM

## 2025-03-19 RX ORDER — POTASSIUM CHLORIDE 1500 MG/1
40 TABLET, EXTENDED RELEASE ORAL PRN
Status: DISCONTINUED | OUTPATIENT
Start: 2025-03-19 | End: 2025-03-22 | Stop reason: HOSPADM

## 2025-03-19 RX ORDER — LISINOPRIL 10 MG/1
10 TABLET ORAL DAILY
Status: DISCONTINUED | OUTPATIENT
Start: 2025-03-19 | End: 2025-03-19

## 2025-03-19 RX ORDER — HYDROCHLOROTHIAZIDE 25 MG/1
25 TABLET ORAL DAILY
Status: DISCONTINUED | OUTPATIENT
Start: 2025-03-19 | End: 2025-03-20

## 2025-03-19 RX ORDER — ATORVASTATIN CALCIUM 40 MG/1
40 TABLET, FILM COATED ORAL DAILY
Status: DISCONTINUED | OUTPATIENT
Start: 2025-03-19 | End: 2025-03-20

## 2025-03-19 RX ORDER — SODIUM CHLORIDE 0.9 % (FLUSH) 0.9 %
5-40 SYRINGE (ML) INJECTION EVERY 12 HOURS SCHEDULED
Status: DISCONTINUED | OUTPATIENT
Start: 2025-03-19 | End: 2025-03-22 | Stop reason: HOSPADM

## 2025-03-19 RX ORDER — SUCCINYLCHOLINE CHLORIDE 20 MG/ML
INJECTION INTRAMUSCULAR; INTRAVENOUS
Status: DISCONTINUED | OUTPATIENT
Start: 2025-03-19 | End: 2025-03-19

## 2025-03-19 RX ORDER — MECOBALAMIN 5000 MCG
5 TABLET,DISINTEGRATING ORAL NIGHTLY PRN
Status: DISCONTINUED | OUTPATIENT
Start: 2025-03-19 | End: 2025-03-22 | Stop reason: HOSPADM

## 2025-03-19 RX ORDER — ENOXAPARIN SODIUM 100 MG/ML
40 INJECTION SUBCUTANEOUS DAILY
Status: DISCONTINUED | OUTPATIENT
Start: 2025-03-19 | End: 2025-03-22 | Stop reason: HOSPADM

## 2025-03-19 RX ORDER — LIDOCAINE HYDROCHLORIDE 20 MG/ML
INJECTION, SOLUTION EPIDURAL; INFILTRATION; INTRACAUDAL; PERINEURAL
Status: DISCONTINUED | OUTPATIENT
Start: 2025-03-19 | End: 2025-03-19 | Stop reason: SDUPTHER

## 2025-03-19 RX ORDER — SODIUM CHLORIDE 0.9 % (FLUSH) 0.9 %
5-40 SYRINGE (ML) INJECTION PRN
Status: DISCONTINUED | OUTPATIENT
Start: 2025-03-19 | End: 2025-03-22 | Stop reason: HOSPADM

## 2025-03-19 RX ORDER — PROPOFOL 10 MG/ML
INJECTION, EMULSION INTRAVENOUS
Status: DISCONTINUED | OUTPATIENT
Start: 2025-03-19 | End: 2025-03-19 | Stop reason: SDUPTHER

## 2025-03-19 RX ORDER — TRAZODONE HYDROCHLORIDE 100 MG/1
100 TABLET ORAL NIGHTLY
Status: DISCONTINUED | OUTPATIENT
Start: 2025-03-20 | End: 2025-03-22 | Stop reason: HOSPADM

## 2025-03-19 RX ORDER — POTASSIUM CHLORIDE 7.45 MG/ML
10 INJECTION INTRAVENOUS PRN
Status: DISCONTINUED | OUTPATIENT
Start: 2025-03-19 | End: 2025-03-22 | Stop reason: HOSPADM

## 2025-03-19 RX ORDER — THIAMINE HYDROCHLORIDE 100 MG/ML
100 INJECTION, SOLUTION INTRAMUSCULAR; INTRAVENOUS DAILY
Status: DISCONTINUED | OUTPATIENT
Start: 2025-03-19 | End: 2025-03-22 | Stop reason: HOSPADM

## 2025-03-19 RX ORDER — ONDANSETRON 4 MG/1
4 TABLET, ORALLY DISINTEGRATING ORAL EVERY 8 HOURS PRN
Status: DISCONTINUED | OUTPATIENT
Start: 2025-03-19 | End: 2025-03-22 | Stop reason: HOSPADM

## 2025-03-19 RX ORDER — ATENOLOL 25 MG/1
25 TABLET ORAL DAILY
Status: DISCONTINUED | OUTPATIENT
Start: 2025-03-19 | End: 2025-03-20

## 2025-03-19 RX ORDER — ONDANSETRON 2 MG/ML
4 INJECTION INTRAMUSCULAR; INTRAVENOUS EVERY 6 HOURS PRN
Status: DISCONTINUED | OUTPATIENT
Start: 2025-03-19 | End: 2025-03-22 | Stop reason: HOSPADM

## 2025-03-19 RX ORDER — ACETAMINOPHEN 650 MG/1
650 SUPPOSITORY RECTAL EVERY 6 HOURS PRN
Status: DISCONTINUED | OUTPATIENT
Start: 2025-03-19 | End: 2025-03-22 | Stop reason: HOSPADM

## 2025-03-19 RX ORDER — POLYETHYLENE GLYCOL 3350 17 G/17G
17 POWDER, FOR SOLUTION ORAL DAILY PRN
Status: DISCONTINUED | OUTPATIENT
Start: 2025-03-19 | End: 2025-03-22 | Stop reason: HOSPADM

## 2025-03-19 RX ORDER — SODIUM CHLORIDE 9 MG/ML
INJECTION, SOLUTION INTRAVENOUS CONTINUOUS
Status: ACTIVE | OUTPATIENT
Start: 2025-03-19 | End: 2025-03-20

## 2025-03-19 RX ORDER — CALCIUM CARBONATE 500 MG/1
500 TABLET, CHEWABLE ORAL ONCE
Status: COMPLETED | OUTPATIENT
Start: 2025-03-19 | End: 2025-03-19

## 2025-03-19 RX ORDER — SODIUM CHLORIDE 9 MG/ML
INJECTION, SOLUTION INTRAVENOUS PRN
Status: DISCONTINUED | OUTPATIENT
Start: 2025-03-19 | End: 2025-03-22 | Stop reason: HOSPADM

## 2025-03-19 RX ADMIN — ATORVASTATIN CALCIUM 40 MG: 40 TABLET, FILM COATED ORAL at 08:28

## 2025-03-19 RX ADMIN — PANTOPRAZOLE SODIUM 40 MG: 40 INJECTION, POWDER, FOR SOLUTION INTRAVENOUS at 14:57

## 2025-03-19 RX ADMIN — PROPOFOL 30 MG: 10 INJECTION, EMULSION INTRAVENOUS at 13:01

## 2025-03-19 RX ADMIN — SODIUM CHLORIDE: 0.9 INJECTION, SOLUTION INTRAVENOUS at 00:39

## 2025-03-19 RX ADMIN — FLUOXETINE HYDROCHLORIDE 40 MG: 20 CAPSULE ORAL at 08:28

## 2025-03-19 RX ADMIN — PANTOPRAZOLE SODIUM 40 MG: 40 INJECTION, POWDER, FOR SOLUTION INTRAVENOUS at 00:35

## 2025-03-19 RX ADMIN — ONDANSETRON 4 MG: 2 INJECTION, SOLUTION INTRAMUSCULAR; INTRAVENOUS at 08:38

## 2025-03-19 RX ADMIN — SODIUM CHLORIDE, PRESERVATIVE FREE 10 ML: 5 INJECTION INTRAVENOUS at 19:27

## 2025-03-19 RX ADMIN — SODIUM CHLORIDE, PRESERVATIVE FREE 10 ML: 5 INJECTION INTRAVENOUS at 00:37

## 2025-03-19 RX ADMIN — LIDOCAINE HYDROCHLORIDE 100 MG: 20 INJECTION, SOLUTION EPIDURAL; INFILTRATION; INTRACAUDAL; PERINEURAL at 12:59

## 2025-03-19 RX ADMIN — GABAPENTIN 800 MG: 400 CAPSULE ORAL at 20:18

## 2025-03-19 RX ADMIN — PROPOFOL 30 MG: 10 INJECTION, EMULSION INTRAVENOUS at 12:59

## 2025-03-19 RX ADMIN — SODIUM CHLORIDE, PRESERVATIVE FREE 10 ML: 5 INJECTION INTRAVENOUS at 08:29

## 2025-03-19 RX ADMIN — PROPOFOL 30 MG: 10 INJECTION, EMULSION INTRAVENOUS at 13:03

## 2025-03-19 RX ADMIN — POTASSIUM BICARBONATE 40 MEQ: 782 TABLET, EFFERVESCENT ORAL at 23:33

## 2025-03-19 RX ADMIN — POTASSIUM CHLORIDE 10 MEQ: 10 INJECTION, SOLUTION INTRAVENOUS at 00:42

## 2025-03-19 RX ADMIN — GABAPENTIN 800 MG: 400 CAPSULE ORAL at 14:56

## 2025-03-19 RX ADMIN — POTASSIUM CHLORIDE 10 MEQ: 10 INJECTION, SOLUTION INTRAVENOUS at 07:31

## 2025-03-19 RX ADMIN — POTASSIUM BICARBONATE 40 MEQ: 782 TABLET, EFFERVESCENT ORAL at 15:46

## 2025-03-19 RX ADMIN — SODIUM CHLORIDE, SODIUM LACTATE, POTASSIUM CHLORIDE, AND CALCIUM CHLORIDE: .6; .31; .03; .02 INJECTION, SOLUTION INTRAVENOUS at 12:55

## 2025-03-19 RX ADMIN — ANTACID TABLETS 500 MG: 500 TABLET, CHEWABLE ORAL at 00:49

## 2025-03-19 RX ADMIN — SODIUM CHLORIDE, SODIUM LACTATE, POTASSIUM CHLORIDE, AND CALCIUM CHLORIDE: .6; .31; .03; .02 INJECTION, SOLUTION INTRAVENOUS at 11:59

## 2025-03-19 RX ADMIN — POTASSIUM CHLORIDE 10 MEQ: 10 INJECTION, SOLUTION INTRAVENOUS at 08:33

## 2025-03-19 RX ADMIN — PROPOFOL 300 MCG/KG/MIN: 10 INJECTION, EMULSION INTRAVENOUS at 13:00

## 2025-03-19 RX ADMIN — GABAPENTIN 800 MG: 400 CAPSULE ORAL at 08:28

## 2025-03-19 RX ADMIN — POTASSIUM PHOSPHATE, MONOBASIC AND POTASSIUM PHOSPHATE, DIBASIC 30 MMOL: 224; 236 INJECTION, SOLUTION, CONCENTRATE INTRAVENOUS at 23:33

## 2025-03-19 RX ADMIN — ONDANSETRON 4 MG: 2 INJECTION, SOLUTION INTRAMUSCULAR; INTRAVENOUS at 00:52

## 2025-03-19 RX ADMIN — POTASSIUM CHLORIDE 10 MEQ: 10 INJECTION, SOLUTION INTRAVENOUS at 06:26

## 2025-03-19 RX ADMIN — POTASSIUM CHLORIDE 10 MEQ: 10 INJECTION, SOLUTION INTRAVENOUS at 05:27

## 2025-03-19 RX ADMIN — THIAMINE HYDROCHLORIDE 100 MG: 100 INJECTION, SOLUTION INTRAMUSCULAR; INTRAVENOUS at 10:12

## 2025-03-19 RX ADMIN — PROPOFOL 200 MCG/KG/MIN: 10 INJECTION, EMULSION INTRAVENOUS at 13:06

## 2025-03-19 RX ADMIN — PROPOFOL 30 MG: 10 INJECTION, EMULSION INTRAVENOUS at 13:04

## 2025-03-19 RX ADMIN — POTASSIUM CHLORIDE 10 MEQ: 10 INJECTION, SOLUTION INTRAVENOUS at 02:39

## 2025-03-19 RX ADMIN — POTASSIUM CHLORIDE 10 MEQ: 10 INJECTION, SOLUTION INTRAVENOUS at 01:37

## 2025-03-19 ASSESSMENT — PAIN - FUNCTIONAL ASSESSMENT
PAIN_FUNCTIONAL_ASSESSMENT: NONE - DENIES PAIN

## 2025-03-19 ASSESSMENT — PAIN SCALES - GENERAL
PAINLEVEL_OUTOF10: 0
PAINLEVEL_OUTOF10: 0

## 2025-03-19 NOTE — ED NOTES
Patient Name: Zulay Louis  : 1960 64 y.o.  MRN: [id number]  ED Room #: B20/B20-20     Chief complaint:   Chief Complaint   Patient presents with    Nausea    Vomiting     Pt. Presents to ED via EMS with c/o nausea and vomiting for past three weeks.      Hospital Problem/Diagnosis:   Hospital Problems           Last Modified POA    * (Principal) Hypokalemia 3/18/2025 Yes         O2 Flow Rate:    (if applicable)  Cardiac Rhythm:   (if applicable)  Active LDA's:   Peripheral IV 25 Right Antecubital (Active)            How does patient ambulate? Bed Bound    2. How does patient take pills? Whole with Water    3. Is patient alert? Alert    4. Is patient oriented? To Person, To Place, To Time, and To Situation    5.   Patient arrived from:  home  Facility Name: ___________________________________________    6. If patient is disoriented or from a Skill Nursing Facility has family been notified of admission? Yes    7. Patient belongings? Belongings: Cell Phone and Clothing    Disposition of belongings? Kept with Patient     8. Any specific patient or family belongings/needs/dynamics?   a.     9. Miscellaneous comments/pending orders?  a. Pt coming from home for n/v for 3 weeks. Pt has not had episodes of emesis here but has critically low K. Is currently receiving 3 out of 4 bags of K. On RA. Ambulates with x1 assistance. A&Ox4. On purewick and still needs UA.       If there are any additional questions please reach out to the Emergency Department.      Carlos Beltran, STEFANO  25 [phone]

## 2025-03-19 NOTE — PROGRESS NOTES
4 Eyes Skin Assessment     NAME:  Zulay Louis  YOB: 1960  MEDICAL RECORD NUMBER:  [id number]    The patient is being assessed for  Admission    I agree that at least one RN has performed a thorough Head to Toe Skin Assessment on the patient. ALL assessment sites listed below have been assessed.      Areas assessed by both nurses:    Head, Face, Ears, Shoulders, Back, Chest, Arms, Elbows, Hands, Sacrum. Buttock, Coccyx, Ischium, and Legs. Feet and Heels        Does the Patient have a Wound? No noted wound(s)       Jasper Prevention initiated by RN: No  Wound Care Orders initiated by RN: No    Pressure Injury (Stage 3,4, Unstageable, DTI, NWPT, and Complex wounds) if present, place Wound referral order by RN under : No    New Ostomies, if present place, Ostomy referral order under : No     Nurse 1 eSignature: Electronically signed by Bella Barnes RN on 3/19/25 at 1:43 AM EDT    **SHARE this note so that the co-signing nurse can place an eSignature**    Nurse 2 eSignature: Electronically signed by Selina Chavez RN on 3/19/25 at 1:48 AM EDT

## 2025-03-19 NOTE — ANESTHESIA POSTPROCEDURE EVALUATION
Department of Anesthesiology  Postprocedure Note    Patient: Zulay Louis  MRN: [id number]  YOB: 1960  Date of evaluation: 3/19/2025    Procedure Summary       Date: 03/19/25 Room / Location: [facility] ENDO  / [facility]    Anesthesia Start: 1254 Anesthesia Stop: 1315    Procedure: ESOPHAGOGASTRODUODENOSCOPY BIOPSY WITH BALLOON DILATION (Upper GI Region) Diagnosis:       Nausea and vomiting, unspecified vomiting type      (Nausea and vomiting, unspecified vomiting type [R11.2])    Surgeons: Ra Howard MD Responsible Provider: Pedrito Shine MD    Anesthesia Type: MAC ASA Status: 3            Anesthesia Type: No value filed.    Filomena Phase I: Filomena Score: 10    Filomena Phase II: Filomena Score: 10    Anesthesia Post Evaluation    Patient location during evaluation: PACU  Patient participation: complete - patient participated  Level of consciousness: awake  Pain score: 0  Airway patency: patent  Nausea & Vomiting: no nausea  Cardiovascular status: hemodynamically stable  Respiratory status: acceptable  Hydration status: stable  Pain management: adequate    No notable events documented.

## 2025-03-19 NOTE — ANESTHESIA PRE PROCEDURE
Negative    Recent Troponin negative     Neuro/Psych:   (+) psychiatric history:            GI/Hepatic/Renal:            ROS comment: Recent N/V.  Wt loss.  No Vomiting for 2 days..   Endo/Other:    (+) electrolyte abnormalities (Hypokalemia).                 Abdominal:             Vascular: negative vascular ROS.         Other Findings:       Anesthesia Plan      MAC     ASA 3       Induction: intravenous.      Anesthetic plan and risks discussed with patient.      Plan discussed with CRNA.    Attending anesthesiologist reviewed and agrees with Preprocedure content            LARISSA OSMAN MD   3/19/2025

## 2025-03-19 NOTE — CARE COORDINATION
9:50am: Case Management Assessment  Initial Evaluation    Date/Time of Evaluation: 3/19/2025 9:49 AM  Assessment Completed by: ELIZABETH Bowedn   for [facility] ([facility])  [email or web address] Mobile: [phone]    If patient is discharged prior to next notation, then this note serves as note for discharge by case management.    Patient Name: Zulay Louis                   YOB: 1960  Diagnosis: Hypokalemia [E87.6]  Nausea and vomiting, unspecified vomiting type [R11.2]                   Date / Time: 3/18/2025  4:41 PM    Patient Admission Status: Inpatient   Readmission Risk (Low < 19, Mod (19-27), High > 27): Readmission Risk Score: 8.7    Current PCP: Marco A Rdz MD  PCP verified by CM? Yes    Chart Reviewed: Yes      History Provided by: Patient  Patient Orientation: Alert and Oriented    Patient Cognition: Alert    Hospitalization in the last 30 days (Readmission):  No    If yes, Readmission Assessment in CM Navigator will be completed.    Advance Directives:      Code Status: Full Code   Patient's Primary Decision Maker is:  (pt aware without POA in place, her son would be her next of kin. she was open to talking about advance directives for her sister. RN aware to place consult)      Discharge Planning:    Patient lives with: Alone Type of Home: Apartment  Primary Care Giver: Self  Patient Support Systems include: Children, Family Members   Current Financial resources: Medicaid, Other (Comment) ([facility] medicare)  Current community resources: None  Current services prior to admission: Durable Medical Equipment, Meals On Wheels            Current DME: Cane, Other (Comment) ([employer])            Type of Home Care services:  None    ADLS  Prior functional level: Independent in ADLs/IADLs  Current functional level: Independent in ADLs/IADLs    PT AM-PAC:   /24  OT AM-PAC:   /24    Family can provide assistance at DC: Yes  Would you like Case Management  to discuss the discharge plan with any other family members/significant others, and if so, who? Yes  Plans to Return to Present Housing: Yes  Other Identified Issues/Barriers to RETURNING to current housing: n/a  Potential Assistance needed at discharge: Home Care            Potential DME:    Patient expects to discharge to: Apartment  Plan for transportation at discharge:      Financial    Payor: [facility] MEDICARE / Plan: [facility] AAR MEDICARE ADVANTAGE / Product Type: *No Product type* /     Does insurance require precert for SNF: Yes    Potential assistance Purchasing Medications: No  Meds-to-Beds request:        [place], [place] - [address] [facility] - P [phone] - F [phone]  [address] [facility] [facility] [address]  Phone: [phone] Fax: [phone]      Notes:    Factors facilitating achievement of predicted outcomes: Family support, Motivated, Cooperative, Pleasant, Good insight into deficits, and Has needed Durable Medical Equipment at home    Barriers to discharge: Medical complications    Additional Case Management Notes: Chart review completed. Spoke with pt at bedside this AM. Pt stated she lives alone and is independent with her ADL's. She said her son Nilesh drives. She is active with [place] ([employer]) for meals on wheels and they provided her lift chair. She said that if they call, hospital can speak with [employer]. She plans on returning home and will have a ride home.     SW will follow     The Plan for Transition of Care is related to the following treatment goals of Hypokalemia [E87.6]  Nausea and vomiting, unspecified vomiting type [R11.2]    Rosy BOJORQUEZ, ELIZABETH   for [facility] ([facility])  [employer] Mobile: [phone]

## 2025-03-19 NOTE — CONSULTS
[facility]  GI Consultation                                                                 Patient: Zulay Louis  : 1960       Date:  3/19/2025           History of Present Illness:  64 y.o. female with PMHx coronary artery disease, essential hypertension, depression, anxiety disorder  admitted for vomiting seen in consult for persistent nausea/vomiting and possible endoscopic evaluation.    Pt reporting several weeks of recurrent, daily nausea/vomiting and minimal PO intake accompanied by general fatigue and CARMONA. Additionally reports weight loss 50lbs since 2024. Initial labs remarkable for K 2.5, Cl 93. LFTs unremarkable.     Past Medical History:   Diagnosis Date    CAD (coronary artery disease)     Depression     DJD (degenerative joint disease)     Hypertension       Past Surgical History:   Procedure Laterality Date    ANGIOPLASTY      HYSTERECTOMY (CERVIX STATUS UNKNOWN)      KNEE SURGERY          PAST ENDOSCOPIC HISTORY:   Never had EGD    MEDICATION:  Admission Meds  No current facility-administered medications on file prior to encounter.     Current Outpatient Medications on File Prior to Encounter   Medication Sig Dispense Refill    gabapentin (NEURONTIN) 800 MG tablet Take 1 tablet by mouth 3 times daily.      atenolol (TENORMIN) 25 MG tablet Take 1 tablet by mouth daily 30 tablet 0    hydroCHLOROthiazide (HYDRODIURIL) 25 MG tablet Take 1 tablet by mouth daily 30 tablet 0    FLUoxetine (PROZAC) 40 MG capsule Take 1 capsule by mouth daily      aspirin 81 MG tablet Take 1 tablet by mouth daily      atorvastatin (LIPITOR) 40 MG tablet Take 1 tablet by mouth daily      traZODone (DESYREL) 100 MG tablet Take 1-2 tablets by mouth at bedtime           Allergies  Allergies   Allergen Reactions    Sulfa Antibiotics Itching    Tramadol Itching      Social   Social History     Tobacco Use    Smoking status: Never    Smokeless tobacco: Never   Substance Use Topics    Alcohol use:

## 2025-03-19 NOTE — PROGRESS NOTES
Clinical Pharmacy Consult Note  Medication History     Admit Date: 3/18/2025    List of current medications patient is taking is complete. Home Medication list in Epic updated to reflect changes noted below.    Source of information: I spoke to the patient and viewed her dispense report.    Patient's home pharmacy: [address] [phone] -  [phone]      Changes made to medication list:   Medications removed: (include reason, ex: therapy completed, patient no longer taking, etc.)  Lisinopril- Not taking  Claritin- Not taking  Losartan- Not taking  Medroxyprogesterone- Not taking  Meloxicam- Not taking  Medications added:   Gabapentin  Medication doses adjusted:   Aspirin 81 mg once daily (not 325 mg once daily)  Fluoxetine 40 mg once daily (not 20 mg once daily)  Trazodone 100-200 mg QHS (not 50 mg QHS)  Other notes:   None    Current Outpatient Medications   Medication Instructions    aspirin 81 MG tablet 1 tablet, DAILY    atenolol (TENORMIN) 25 mg, Oral, DAILY    atorvastatin (LIPITOR) 40 MG tablet 1 tablet, DAILY    FLUoxetine (PROZAC) 40 MG capsule 1 capsule, DAILY    gabapentin (NEURONTIN) 800 MG tablet 1 tablet, 3 TIMES DAILY    hydroCHLOROthiazide (HYDRODIURIL) 25 mg, Oral, DAILY    traZODone (DESYREL) 100 MG tablet 1-2 tablets, Oral, Nightly,         Thank you for consulting pharmacy,    Chandana Rodriguez, Pharmacy Intern

## 2025-03-19 NOTE — H&P
[facility] Medicine History & Physical      Date of Admission: 3/18/2025    Date of Service:  Pt seen/examined on 03/18/25     [x]Admitted to Inpatient with expected LOS greater than two midnights due to medical therapy.  []Placed in Observation status.    Chief Admission Complaint: Vomiting    Presenting Admission History:      64 y.o. female with PMHx significant for coronary artery disease, essential hypertension, depression, anxiety disorder who presented to ED with a complaint of persistent vomiting over the last several weeks.  Patient states that she has been having almost daily vomiting of multiple episodes of emesis over the last 1 to 2 months.  Patient indicates that she did have weight loss about 50 pounds.  Patient has been having poor oral intake due to her persistent vomiting patient reports generalized weakness and fatigue.  Patient also report progressive dyspnea on exertion recently.  Patient denies any leg swelling or orthopnea.  Patient denies any fever or chills.  No stomach pain diarrhea constipation.  Labs remarkable for potassium of 2.5.  CT of the abdomen and pelvis was obtained in ED did not show any evidence of acute pathology.  Patient is currently being admitted under inpatient status for further management and evaluation.      ROS:     Review of 10 systems is negative except what is outlined in HPI.     Assessment:  Persistent vomiting.  Hypokalemia.  Hypotension  Failure to thrive in adult.  Unintentional weight loss.  Coronary artery disease.  Essential hypertension.  Rheumatoid arthritis, in remission.  Major depressive disorder.  Generalized anxiety disorder.      Plan:    Patient is admitted under inpatient status.  Continue IV potassium replacement.  Continue IV fluids for now.  GI consultation for further evaluation of persistent vomiting.  Hold home hydrochlorothiazide, atenolol, aspirin and meloxicam.  GI consultation in the morning.  Resume the rest of home medication as

## 2025-03-19 NOTE — OP NOTE
[facility]  Esophagogastroduodenoscopy Note        Patient: Zulay Louis  : 1960     Procedure: Esophagogastroduodenoscopy with biopsy, esophageal dilation    Date:  3/19/2025     Anesthesia: MAC    Indications: Nausea and vomiting.     Description of Procedure:  Informed consent was obtained from the patient after explanation of indications, benefits and possible risks and complications of the procedure.   Patient was placed in the left lateral decubitus position and the above IV sedation was administrered.    The Olympus videoendoscope was placed in the patient's mouth and under direct visualization passed into the esophagus.   The scope was then advanced into the stomach and then into the second portion of the duodenum.       Esophagus showed tight stricture at the GE junction, likely peptic.  The endoscope could not be passed across.  An esophageal balloon dilation catheter was used to sequentially dilate to 15 mm.  The balloon was withdrawn and the endoscope could not passed across.  There was mucosal injury noted at the dilation site.  The stomach showed gastritis in the body of the stomach, biopsies taken for H. pylori  The duodenum showed no abnormalities    Retroflexed views of the cardia and fundus showed no other abnormalities.  The scope was anteflexed and the stomach was decompressed, and the scope was completely withdrawn.  The patient tolerated the procedure well.    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

## 2025-03-19 NOTE — PLAN OF CARE
Problem: Safety - Adult  Goal: Free from fall injury  3/19/2025 1127 by Annette Woo, RN  Outcome: Progressing  Flowsheets (Taken 3/19/2025 1127)  Free From Fall Injury:   Instruct family/caregiver on patient safety   Based on caregiver fall risk screen, instruct family/caregiver to ask for assistance with transferring infant if caregiver noted to have fall risk factors  Note: Patient has weakness in her bilateral lower extremities.   3/19/2025 0313 by Bella Barnes, RN  Outcome: Progressing     Problem: ABCDS Injury Assessment  Goal: Absence of physical injury  3/19/2025 1127 by Annette Woo RN  Outcome: Progressing  Flowsheets (Taken 3/19/2025 1127)  Absence of Physical Injury: Implement safety measures based on patient assessment  Note: Bed alarm on, non skid socks on. Patient is educated on the bed alarm and when to utilize her call light.   3/19/2025 0313 by Bella Barnes, RN  Outcome: Progressing

## 2025-03-19 NOTE — PROGRESS NOTES
Advance Care Planning     Advance Care Planning Inpatient Note  Spiritual Care Department    Today's Date: 3/19/2025  Unit: [address]    Received request from HealthCare Provider.  Upon review of chart and communication with care team, Spiritual Care will defer advance care planning with patient at this time.. Patient and Healthcare Decision Maker was/were present in the room during visit.    Goals of ACP Conversation:  Discuss advance care planning documents    Health Care Decision Makers:     No healthcare decision makers have been documented.    Summary:  No Decision Maker named by patient at this time    Advance Care Planning Documents (Patient Wishes):  Healthcare Power of /Advance Directive Appointment of Health Care Agent  Living Will/Advance Directive  Anatomical Gift/Organ Donation     Assessment:  Patient was under sedation earlier in the day.  Although able to engage in conversation, patient reported still being somewhat groggy.  Patient and sister (indicated by patient as the desired healthcare power of ) asked for clarification about documents, how they should be filled out, and what would be covered; then asked for some additional time to review before completing.   indicated that blanks and information could be added to documents, but patient should not initial or sign any of the documents until the appropriate witnesses were present.  Patient and sister were told to ask the nurse to put in a follow-up request with spiritual care once the patient was ready to complete.      Interventions:  Provided education on documents for clarity and greater understanding  Discussed and provided education on state decision maker hierarchy  Encouraged ongoing ACP conversation with future decision makers and loved ones  Reviewed but did not complete ACP document    Care Preferences Communicated:   No    Outcomes/Plan:  ACP Discussion: Postponed    Electronically signed by Margarito

## 2025-03-19 NOTE — PROGRESS NOTES
V2.0    [facility] Progress Note      Name:  Zulay Louis /Age/Sex: 1960  (64 y.o. female)   MRN & CSN:  [id number] & [id number] Encounter Date/Time: 3/19/2025 9:11 AM EDT   Location:  [facility]- PCP: Marco A Rdz MD     Attending:Darlene Martino MD       Hospital Day: 2    Assessment and Recommendations     Active Hospital Problems    Diagnosis Date Noted    High anion gap metabolic acidosis [E87.29] 2025     Priority: High    Hypophosphatemia [E83.39] 2025     Priority: High    Severe life threatening hypokalemia [E87.6] 2025     Priority: High    Peptic stricture of esophagus [K22.2] 2025    Acute gastritis without hemorrhage [K29.00] 2025       Zulay Louis is a 64 y.o. female   PMHx significant for coronary artery disease, essential hypertension, depression, anxiety disorder who presented to ED with a complaint of persistent vomiting over the last several weeks.    Patient states that she has been having almost daily vomiting of multiple episodes of emesis.  Patient indicates that she did have weight loss about 50 pounds.    Patient has been having poor oral intake due to her persistent vomiting patient reports generalized weakness and fatigue.  Patient also report progressive dyspnea on exertion recently.    Patient denies any leg swelling or orthopnea.    Patient denies any fever or chills.  No stomach pain diarrhea constipation.    Labs remarkable for potassium of 2.5.    CT of the abdomen and pelvis was obtained in ED did not show any evidence of acute pathology.    Patient is currently being admitted under inpatient status for further management and evaluation.         Assessment:  Active Hospital Problems    Diagnosis Date Noted    High anion gap metabolic acidosis [E87.29] 2025     Priority: High    Hypophosphatemia [E83.39] 2025     Priority: High    Severe life threatening hypokalemia [E87.6] 2025     Priority: High    Peptic    Recent Labs     03/18/25  1629 03/18/25  1749 03/18/25  1909 03/19/25  0327 03/19/25  0934    139  --  138  --    K 2.7* 2.5* 2.5* 2.9* 3.2*   CL 92* 93*  --  98*  --    CO2 24 24  --  23  --    BUN 12 12  --  11  --    CREATININE 1.0 1.0  --  0.9  --    GLUCOSE 81 82  --  77  --      Hepatic:   Recent Labs     03/18/25  1629   AST 29   ALT 7*   BILITOT 0.8   ALKPHOS 88     Lipids:   Lab Results   Component Value Date/Time    CHOL 129 11/10/2012 04:15 AM    HDL 65 11/10/2012 04:15 AM    HDL 61 10/28/2011 06:01 AM    TRIG 51 11/10/2012 04:15 AM     Hemoglobin A1C: No results found for: \"LABA1C\"  TSH: No results found for: \"TSH\"  Troponin: No results found for: \"TROPONINT\"  Lactic Acid:   Recent Labs     03/18/25  1750   LACTA 1.0     BNP:   Recent Labs     03/18/25  1749   PROBNP 217*     UA:  Lab Results   Component Value Date/Time    NITRU Negative 03/18/2025 05:39 PM    COLORU Yellow 03/18/2025 05:39 PM    PHUR 6.5 03/18/2025 05:39 PM    PHUR 5.5 08/28/2013 02:21 AM    WBCUA 0-2 12/27/2024 09:21 PM    RBCUA 0-2 12/27/2024 09:21 PM    BACTERIA 1+ 12/27/2024 09:21 PM    CLARITYU Clear 03/18/2025 05:39 PM    LEUKOCYTESUR Negative 03/18/2025 05:39 PM    UROBILINOGEN 0.2 03/18/2025 05:39 PM    BILIRUBINUR MODERATE 03/18/2025 05:39 PM    BLOODU Negative 03/18/2025 05:39 PM    GLUCOSEU Negative 03/18/2025 05:39 PM    GLUCOSEU Negative 10/28/2011 09:40 AM    KETUA 40 03/18/2025 05:39 PM    AMORPHOUS 1+ 12/27/2024 09:21 PM     Urine Cultures: No results found for: \"LABURIN\"  Blood Cultures: No results found for: \"BC\"  No results found for: \"BLOODCULT2\"  Organism: No results found for: \"ORG\"      Electronically signed by Darlene Martino MD on 3/19/2025 at 2:22 PM

## 2025-03-20 PROBLEM — E43 SEVERE MALNUTRITION: Status: ACTIVE | Noted: 2025-03-20

## 2025-03-20 PROBLEM — E87.8 REFEEDING SYNDROME: Status: ACTIVE | Noted: 2025-03-20

## 2025-03-20 LAB
ANION GAP SERPL CALCULATED.3IONS-SCNC: 9 MMOL/L (ref 3–16)
BUN SERPL-MCNC: 6 MG/DL (ref 7–20)
CALCIUM SERPL-MCNC: 9.1 MG/DL (ref 8.3–10.6)
CHLORIDE SERPL-SCNC: 104 MMOL/L (ref 99–110)
CO2 SERPL-SCNC: 26 MMOL/L (ref 21–32)
CREAT SERPL-MCNC: 0.8 MG/DL (ref 0.6–1.2)
GFR SERPLBLD CREATININE-BSD FMLA CKD-EPI: 81 ML/MIN/{1.73_M2}
GLUCOSE SERPL-MCNC: 93 MG/DL (ref 70–99)
MAGNESIUM SERPL-MCNC: 1.69 MG/DL (ref 1.8–2.4)
MAGNESIUM SERPL-MCNC: 2.11 MG/DL (ref 1.8–2.4)
PHOSPHATE SERPL-MCNC: 1 MG/DL (ref 2.5–4.9)
PHOSPHATE SERPL-MCNC: 2.3 MG/DL (ref 2.5–4.9)
POTASSIUM SERPL-SCNC: 3.5 MMOL/L (ref 3.5–5.1)
POTASSIUM SERPL-SCNC: 3.8 MMOL/L (ref 3.5–5.1)
POTASSIUM SERPL-SCNC: ABNORMAL MMOL/L (ref 3.5–5.1)
PREALB SERPL-MCNC: 11.1 MG/DL (ref 20–40)
REASON FOR REJECTION: NORMAL
REJECTED TEST: NORMAL
SODIUM SERPL-SCNC: 139 MMOL/L (ref 136–145)
T4 FREE SERPL-MCNC: 1.3 NG/DL (ref 0.9–1.8)
TSH SERPL DL<=0.005 MIU/L-ACNC: 2.68 UIU/ML (ref 0.27–4.2)

## 2025-03-20 PROCEDURE — 84132 ASSAY OF SERUM POTASSIUM: CPT

## 2025-03-20 PROCEDURE — 2060000000 HC ICU INTERMEDIATE R&B

## 2025-03-20 PROCEDURE — 6370000000 HC RX 637 (ALT 250 FOR IP): Performed by: INTERNAL MEDICINE

## 2025-03-20 PROCEDURE — 83735 ASSAY OF MAGNESIUM: CPT

## 2025-03-20 PROCEDURE — 80048 BASIC METABOLIC PNL TOTAL CA: CPT

## 2025-03-20 PROCEDURE — 36592 COLLECT BLOOD FROM PICC: CPT

## 2025-03-20 PROCEDURE — 84439 ASSAY OF FREE THYROXINE: CPT

## 2025-03-20 PROCEDURE — 2500000003 HC RX 250 WO HCPCS: Performed by: INTERNAL MEDICINE

## 2025-03-20 PROCEDURE — 6360000002 HC RX W HCPCS: Performed by: INTERNAL MEDICINE

## 2025-03-20 PROCEDURE — 2580000003 HC RX 258: Performed by: INTERNAL MEDICINE

## 2025-03-20 PROCEDURE — 84100 ASSAY OF PHOSPHORUS: CPT

## 2025-03-20 PROCEDURE — 84443 ASSAY THYROID STIM HORMONE: CPT

## 2025-03-20 PROCEDURE — 36415 COLL VENOUS BLD VENIPUNCTURE: CPT

## 2025-03-20 RX ORDER — BACLOFEN 10 MG/1
TABLET ORAL
Status: ON HOLD | COMMUNITY
End: 2025-03-20 | Stop reason: CLARIF

## 2025-03-20 RX ORDER — PANTOPRAZOLE SODIUM 40 MG/1
40 TABLET, DELAYED RELEASE ORAL
Qty: 30 TABLET | Refills: 2 | Status: SHIPPED | OUTPATIENT
Start: 2025-03-20 | End: 2025-03-22

## 2025-03-20 RX ORDER — MAGNESIUM SULFATE IN WATER 40 MG/ML
2000 INJECTION, SOLUTION INTRAVENOUS ONCE
Status: COMPLETED | OUTPATIENT
Start: 2025-03-20 | End: 2025-03-20

## 2025-03-20 RX ORDER — SODIUM CHLORIDE, SODIUM LACTATE, POTASSIUM CHLORIDE, AND CALCIUM CHLORIDE .6; .31; .03; .02 G/100ML; G/100ML; G/100ML; G/100ML
500 INJECTION, SOLUTION INTRAVENOUS ONCE
Status: COMPLETED | OUTPATIENT
Start: 2025-03-20 | End: 2025-03-20

## 2025-03-20 RX ORDER — CETIRIZINE HYDROCHLORIDE 10 MG/1
10 TABLET ORAL DAILY
COMMUNITY

## 2025-03-20 RX ORDER — MULTIVITAMIN WITH IRON
1 TABLET ORAL DAILY
COMMUNITY
Start: 2025-03-21

## 2025-03-20 RX ORDER — MELOXICAM 7.5 MG/1
1-2 TABLET ORAL DAILY
Status: ON HOLD | COMMUNITY
End: 2025-03-22 | Stop reason: HOSPADM

## 2025-03-20 RX ORDER — HYDROCHLOROTHIAZIDE 25 MG/1
25 TABLET ORAL DAILY
Status: ON HOLD | COMMUNITY
End: 2025-03-22 | Stop reason: HOSPADM

## 2025-03-20 RX ORDER — FLUTICASONE PROPIONATE 50 MCG
1 SPRAY, SUSPENSION (ML) NASAL DAILY PRN
COMMUNITY

## 2025-03-20 RX ORDER — GABAPENTIN 300 MG/1
300 CAPSULE ORAL 3 TIMES DAILY
Status: DISCONTINUED | OUTPATIENT
Start: 2025-03-20 | End: 2025-03-22 | Stop reason: HOSPADM

## 2025-03-20 RX ORDER — THIAMINE MONONITRATE (VIT B1) 100 MG
100 TABLET ORAL DAILY
COMMUNITY
Start: 2025-03-20

## 2025-03-20 RX ORDER — SODIUM CHLORIDE, SODIUM LACTATE, POTASSIUM CHLORIDE, CALCIUM CHLORIDE 600; 310; 30; 20 MG/100ML; MG/100ML; MG/100ML; MG/100ML
INJECTION, SOLUTION INTRAVENOUS CONTINUOUS
Status: ACTIVE | OUTPATIENT
Start: 2025-03-20 | End: 2025-03-21

## 2025-03-20 RX ADMIN — SODIUM CHLORIDE, PRESERVATIVE FREE 10 ML: 5 INJECTION INTRAVENOUS at 19:39

## 2025-03-20 RX ADMIN — THERA TABS 1 TABLET: TAB at 08:41

## 2025-03-20 RX ADMIN — THIAMINE HYDROCHLORIDE 100 MG: 100 INJECTION, SOLUTION INTRAMUSCULAR; INTRAVENOUS at 08:36

## 2025-03-20 RX ADMIN — SODIUM CHLORIDE, PRESERVATIVE FREE 10 ML: 5 INJECTION INTRAVENOUS at 08:46

## 2025-03-20 RX ADMIN — TRAZODONE HYDROCHLORIDE 100 MG: 100 TABLET ORAL at 19:39

## 2025-03-20 RX ADMIN — GABAPENTIN 300 MG: 300 CAPSULE ORAL at 19:39

## 2025-03-20 RX ADMIN — POTASSIUM PHOSPHATE, MONOBASIC AND POTASSIUM PHOSPHATE, DIBASIC 30 MMOL: 224; 236 INJECTION, SOLUTION, CONCENTRATE INTRAVENOUS at 17:38

## 2025-03-20 RX ADMIN — Medication 500 MG: at 10:58

## 2025-03-20 RX ADMIN — POTASSIUM BICARBONATE 20 MEQ: 782 TABLET, EFFERVESCENT ORAL at 10:53

## 2025-03-20 RX ADMIN — SODIUM CHLORIDE 40 MG: 9 INJECTION INTRAMUSCULAR; INTRAVENOUS; SUBCUTANEOUS at 10:59

## 2025-03-20 RX ADMIN — GABAPENTIN 800 MG: 400 CAPSULE ORAL at 14:06

## 2025-03-20 RX ADMIN — ENOXAPARIN SODIUM 40 MG: 100 INJECTION SUBCUTANEOUS at 08:47

## 2025-03-20 RX ADMIN — MAGNESIUM SULFATE HEPTAHYDRATE 2000 MG: 40 INJECTION, SOLUTION INTRAVENOUS at 11:07

## 2025-03-20 RX ADMIN — GABAPENTIN 800 MG: 400 CAPSULE ORAL at 08:42

## 2025-03-20 RX ADMIN — SODIUM CHLORIDE, SODIUM LACTATE, POTASSIUM CHLORIDE, AND CALCIUM CHLORIDE: .6; .31; .03; .02 INJECTION, SOLUTION INTRAVENOUS at 19:52

## 2025-03-20 RX ADMIN — FLUOXETINE HYDROCHLORIDE 40 MG: 20 CAPSULE ORAL at 08:42

## 2025-03-20 RX ADMIN — SODIUM CHLORIDE, SODIUM LACTATE, POTASSIUM CHLORIDE, AND CALCIUM CHLORIDE 500 ML: .6; .31; .03; .02 INJECTION, SOLUTION INTRAVENOUS at 17:11

## 2025-03-20 ASSESSMENT — PAIN SCALES - GENERAL: PAINLEVEL_OUTOF10: 0

## 2025-03-20 NOTE — PROGRESS NOTES
Pt's blood pressure was 89/58 (MAP 68 at 1546). Patient denied dizziness/lightheadedness. RN notified Dr. Martino at 1554.     MD replied to get FreeT4 and TSH labs along with already ordered K, Mg, and Phos.     Phos came back 1.0. RN notified Dr. Martino. He ordered an LR bolus of 500 over 2 hours, continuous LR at 100, and IV potassium phosphate replacement. Patient will stay overnight to observe and replace fluids/electrolytes.

## 2025-03-20 NOTE — CONSULTS
Clinical Pharmacy Consult Note  Medication History     Admit Date: 3/18/2025    Pharmacy consulted to verify home medication list by Darlene Rodriguez MD.    List of current medications patient is taking is complete. Home Medication list in Epic updated to reflect changes noted below.    Source of information: Dispense Report, office visit note, talked to patient at bedside.    Patient's home pharmacy: [facility] on [address] ([place])     Changes made to medication list:   Medications removed: (include reason, ex: therapy completed, patient no longer taking, etc.)  Baclofen (patient doesn't tolerate)  Medications added:   Zyrtec, Vitamin D, Flonase, Mobic, HCTZ  Medication doses adjusted:   Changed Desyrel from 100 mg 1-2 tablets nightly to just 1 tablet nightly. Patient said 2 tablets make her too groggy.  Other notes:   Med rec was already done 2 days ago by [facility] intern. Re-consulted today with a few changes mentioned above.    Current Outpatient Medications   Medication Instructions    aspirin 81 MG tablet 1 tablet, DAILY    atenolol (TENORMIN) 25 mg, Oral, DAILY    atorvastatin (LIPITOR) 40 MG tablet 1 tablet, DAILY    cetirizine (ZYRTEC) 10 mg, DAILY    FLUoxetine (PROZAC) 40 MG capsule 1 capsule, DAILY    fluticasone (FLONASE) 50 MCG/ACT nasal spray 1 spray, DAILY PRN    gabapentin (NEURONTIN) 800 MG tablet 1 tablet, 3 TIMES DAILY    hydroCHLOROthiazide (HYDRODIURIL) 25 mg, DAILY    meloxicam (MOBIC) 7.5 MG tablet 1-2 tablets, DAILY    [START ON 3/21/2025] Multiple Vitamin (MULTIVITAMIN) TABS tablet 1 tablet, Oral, DAILY    pantoprazole (PROTONIX) 40 mg, Oral, DAILY BEFORE BREAKFAST    traZODone (DESYREL) 100 MG tablet 1 tablet, Oral, Nightly,      vitamin B-1 (THIAMINE) 100 mg, Oral, DAILY    vitamin D (CHOLECALCIFEROL) 1,000 Units, DAILY       Thank you for consulting pharmacy,    Magdaleno Cornejo, Marina BCOP 3/20/2025 5:00 PM

## 2025-03-20 NOTE — DISCHARGE INSTRUCTIONS
Resources for food assistance:     [employer] ([employer] in [facility])  Address: [address] [place] [address]. Phone: [phone]  M: 5-7 pm and T & Th: 10 am-1 pm  Carryout Dinner: M, 5-6 pm Serving ZIP code [id number]  ID required    [facility]   Address: [address] [place] [address]. Phone: [phone]  T & Th: 9 am-2pm Serving [place], [place] [facility], [place] and homeless  Photo ID required    [facility]  Address: [address] [place] [address]. Phone: [phone]  M-F: 9:30-11:30a walk ins welcome  ID and proof of residence required  serving zipcodes [address], [address], [address], [id number] and [address]    [facility]   Address: [address] [place] [address]. Phone: [phone]  M-Sun: 6-7 pm dinner served at 7     [facility]  Address: [address] [place] [address]   Wed. 3/25: 11:30 am-1 pm (food and clothing) .     [employer]  Address: [address] [place] [address]. Phone: [phone]   M-F 8:30 am - 3:30 pm; Customer Connection Center: [phone].    [facility]  Address:  [address] [place] [address]. Phone: [phone]  2nd & 4th week of each month  9/9: 2-4 pm  9/10: 4-6:45 pm  9/22: 3-5 pm  9/23: 9:30-11 am & 2-4 pm Non-perishable items    [facility] ([facility])  Address: [address] [place] [address]. Phone: [phone]  Thursdays: 10 am-2 pm (groceries)  Photo ID required    [facility]   Address: [address], [place] [address]. Phone: [phone]  M-W: 9-11:30 am Serving 60+, zip codes [address], [address] and [address]  ID and proof of residence required    [employer]  Address: [address] [place] [address]. Phone: [phone]   M-F: 8:30 - 11:45 am   only    [facility]  Address: [address] [place] [address]. Phone: [phone]   Bfast: 7:30-8:30 am  Bag lunch

## 2025-03-20 NOTE — PLAN OF CARE
Problem: Safety - Adult  Goal: Free from fall injury  Outcome: Progressing  Note: Pt is a High fall risk. See Sarabia Fall Score and ABCDS Injury Risk assessments.   + Screening for Orthostasis and/or + High Fall Risk per SARABIA/ABCDS: Explained fall risk precautions to pt and family and rationale behind their use to keep the patient safe. Pt bed is in low position, side rails up, call light and belongings are in reach. Fall wristband applied and present on pts wrist.  Bed alarm on.  Pt encouraged to call for assistance. Will continue with hourly rounds for PO intake, pain needs, toileting and repositioning as needed.      Problem: ABCDS Injury Assessment  Goal: Absence of physical injury  Outcome: Progressing  Note: No new reported or observed physical injuries shift.      Problem: Nutrition Deficit:  Goal: Optimize nutritional status  Outcome: Progressing  Note: Patient able to eat some of each shift.      Problem: Metabolic/Fluid and Electrolytes - Adult  Goal: Electrolytes maintained within normal limits  Outcome: Not Progressing  Note: Patient's potassium and phosphate low this shift.

## 2025-03-20 NOTE — CONSULTS
Comprehensive Nutrition Assessment    RECOMMENDATIONS:  PO Diet: Regular  Nutrition Supplement: Add Ensure +HP BID  Nutrition Education: Education/Counseling not indicated     NUTRITION ASSESSMENT:   Nutritional summary & status: Consult for unintentional wt loss and ONS. Admitted w/ hypokalemia d/t intractable n/v for 3 months PTA, poor PO intake and diuretic use. States she would try to eat 3 meals/day, but vomit each time after eating. Significant wt loss of 34# in 3 months. Visible muscle and fat wasting. Discussed need to prevent further unintentional weight loss and regain muscle mass. Pt hesitant, stating \"I was fat and don't want to regain weight.\" Discussed muscle vs fat and negative effect of rapid unintentional weight loss, electrolyte abnormalities, and current severe malnutrition status given prolonged period of inadequate nutrient intake. EGD conducted yesterday showed peptic stricturing requiring dilation. Biopsies taken for H. Pylori. Concern for refeeding. MD started 100 mg B1 and MVI. No nausea currently. Reviewed antiemetic schedule. Agreeable to include a protein at each meal + high protein supplement inbetween meals. RD to closely follow.     Admission // PMH: Severe life threatening hypokalemia // coronary artery disease, essential hypertension, depression, anxiety disorder    MALNUTRITION ASSESSMENT  Context of Malnutrition: Acute Illness   Malnutrition Status: Severe malnutrition  Findings of the 6 clinical characteristics of malnutrition (Minimum of 2 out of 6 clinical characteristics is required to make the diagnosis of moderate or severe Protein Calorie Malnutrition based on AND/ASPEN Guidelines):  Energy Intake:  50% or less of estimated energy requirements for 5 or more days  Weight Loss:  Greater than 7.5% over 3 months (34#/17% in 3 months)     Body Fat Loss:  Mild body fat loss Orbital   Muscle Mass Loss:  Moderate muscle mass loss Temples (temporalis)      NUTRITION DIAGNOSIS    Severe malnutrition, in context of acute illness or injury related to inadequate protein-energy intake as evidenced by criteria as identified in malnutrition assessment    Nutrition Monitoring and Evaluation:   Food/Nutrient Intake Outcomes:  Progression of Nutrition, Food and Nutrient Intake, Supplement Intake  Physical Signs/Symptoms Outcomes:  Biochemical Data, Nutrition Focused Physical Findings, Skin, Weight, Nausea or Vomiting     OBJECTIVE DATA: Significant to nutrition assessment  Nutrition Related Findings: K 3.1, mg 1.75, phos 1.4, +3L, BM 3/18  Wounds: None  Nutrition Goals: Meet at least 75% of estimated needs, by next RD assessment     CURRENT NUTRITION THERAPIES  ADULT DIET; Regular  PO Intake: %   PO Supplement Intake:None Ordered  Additional Sources of Calories/IVF:NA     COMPARATIVE STANDARDS  Energy (kcal):  1338-6033 (22-25 kcal/kg CBW)     Protein (g):  74-85 (1.3-1.5 g/kg IBW)       Fluid (ml/day):  1 ml/kcal    ANTHROPOMETRICS  Current Height: 165.1 cm (5' 5\")  Current Weight - Scale: 77.1 kg (170 lb)    Admission weight: 75.8 kg (167 lb 1.7 oz)    The patient will be monitored per nutrition standards of care. Consult dietitian if additional nutrition interventions are needed prior to RD reassessment.     Sharmila Tavares RD  Hammad:  [phone]

## 2025-03-20 NOTE — CARE COORDINATION
Addendum at 1:20pm:          Case Management Assessment            Discharge Note                    Date / Time of Note: 3/20/2025 1:20 PM                  Discharge Note Completed by: Rosy BOJORQUEZ, ELIZABETH   for [facility] [place] ([facility])  [employer] Mobile: [phone]    Patient Name: Zulay Louis   YOB: 1960  Diagnosis: Hypokalemia [E87.6]  Nausea and vomiting, unspecified vomiting type [R11.2]   Date / Time: 3/18/2025  4:41 PM    Current PCP: Marco A Rdz MD  Clinic patient: No    Hospitalization in the last 30 days: No     Advance Directives:  Code Status: Full Code  [place] DNR form completed and on chart: Not Indicated    Financial:  Payor: [facility] MEDICARE / Plan: [facility] MEDICARE ADVANTAGE / Product Type: *No Product type* /      Pharmacy:    [address] [phone]  [phone]  [address] [place] [address]  Phone: [phone] Fax: [phone]      Assistance purchasing medications?: Potential Assistance Purchasing Medications: No  Assistance provided by Case Management: None at this time    Does patient want to participate in local refill/ meds to beds program?: Yes    Meds To Beds General Rules:  1. Can ONLY be done Monday- Friday between 8:30am-5pm  2. Prescription(s) must be in pharmacy by 3pm to be filled same day  3.Copy of patient's insurance/ prescription drug card and patient face sheet must be sent along with the prescription(s)  4. Cost of Rx cannot be added to hospital bill. If financial assistance is needed, please contact unit  or ;  or  CANNOT provide pharmacy voucher for patients co-pays  5. Patients can then  the prescription on their way out of the hospital at discharge, or pharmacy can deliver to the bedside if staff is available. (payment due at time of pick-up or delivery - cash, check, or card accepted)

## 2025-03-20 NOTE — PLAN OF CARE
Problem: Safety - Adult  Goal: Free from fall injury  3/20/2025 0020 by Annette Bello, RN  Outcome: Progressing  Flowsheets (Taken 3/20/2025 0020)  Free From Fall Injury:   Instruct family/caregiver on patient safety   Based on caregiver fall risk screen, instruct family/caregiver to ask for assistance with transferring infant if caregiver noted to have fall risk factors  Note: Pt is a high fall risk (see [facility] Fall Risk assessment).  Oriented pt to room and call light. Instructed pt to call for help when needed.  Call light and personal items within reach.  Bed in lowest position, brakes on, and 2/4 side rails up.  Non-skid footwear on. Falls risk stop sign on door; hourly visual checks implemented.  Falls risk arm band on pt.  Bed alarm is on.  Pt using call light appropriately.  Will continue to monitor.       Problem: ABCDS Injury Assessment  Goal: Absence of physical injury  3/20/2025 0020 by Annette Bello, RN  Outcome: Progressing  Flowsheets (Taken 3/20/2025 0020)  Absence of Physical Injury: Implement safety measures based on patient assessment

## 2025-03-20 NOTE — PROGRESS NOTES
stricture of esophagus [K22.2] 03/19/2025    Acute gastritis without hemorrhage [K29.00] 03/19/2025       Nausea vomiting, Solid and liquid food Dysphagia unable to tolerate PO intake, EGD with tight stricture suspected Peptic stricture, needing esophageal balloon dilation, sequentially dilate to 15 mm. There was mucosal injury noted at the dilation site. The stomach showed gastritis in the body of the stomach, biopsies taken for H. Pylori. Continue PPI, CLD today and diet advancement per GI recommendations  Refeeding syndrome -phosphorus very low at 1.0, Renografin regimen replacement of potassium phosphorus and magnesium.  Continue multivitamin, thiamine supplementation.  Recheck potassium/Phos/magnesium at night.  Severe life threatening Hypokalemia POA due to persistent vomiting, decreased PO intake and diuretic use. Continue close monitoring, monitor on tele, monitor RFT twice daily. Aim for K > 4.0 and Mag > 2.0  High AG Metabolic acidosis due to starvation ketoacidodsis POA, urinary ketones present, beta hydroxybutyrate elevated  Unintentional weight loss of 50 lbs  Severe PCM, Obvious significant muscle wasting, loss of subcutaneous fat. Weight loss 50 lbs, significantly reduced functional capacity.  Dietitian consulted  Coronary artery disease hx, holding ASA for now  Essential hypertension - hold medications until BP improved  Rheumatoid arthritis, in remission.  Major depressive disorder - continue home prozac  Generalized anxiety disorder    Diet ADULT DIET; Regular   DVT Prophylaxis [] Lovenox, []  Heparin, [x] SCDs, [] Ambulation,  [] Eliquis, [] Xarelto  [] Coumadin   Code Status Full Code   Disposition From: Home  Expected Disposition: Home  Estimated Date of Discharge: 2-3 days, monitoring for refeeding   Surrogate Decision Maker/ POA  Nilesh Barnes (Child)      MDM  [x] High (any 2 of A, B, or C)    A. Problems (any 1)  [x] Acute/Chronic Illness/injury posing threat to life or bodily function: life  TISSUES: Moderate shoulder arthropathy. OTHER: None.     1. No acute disease.  Electronically signed by Yonatan Mejia MD      CBC:   Recent Labs     03/18/25  1629   WBC 8.4   HGB 14.9        BMP:    Recent Labs     03/19/25  0327 03/19/25  0934 03/19/25  1858 03/20/25  0355     --  139 139   K 2.9* 3.2* 3.1* see below   CL 98*  --  102 104   CO2 23  --  27 26   BUN 11  --  7 6*   CREATININE 0.9  --  0.9 0.8   GLUCOSE 77  --  133* 93     Hepatic:   Recent Labs     03/18/25  1629   AST 29   ALT 7*   BILITOT 0.8   ALKPHOS 88     Lipids:   Lab Results   Component Value Date/Time    CHOL 129 11/10/2012 04:15 AM    HDL 65 11/10/2012 04:15 AM    HDL 61 10/28/2011 06:01 AM    TRIG 51 11/10/2012 04:15 AM     Hemoglobin A1C: No results found for: \"LABA1C\"  TSH: No results found for: \"TSH\"  Troponin: No results found for: \"TROPONINT\"  Lactic Acid:   Recent Labs     03/18/25  1750   LACTA 1.0     BNP:   Recent Labs     03/18/25  1749   PROBNP 217*     UA:  Lab Results   Component Value Date/Time    NITRU Negative 03/18/2025 05:39 PM    COLORU Yellow 03/18/2025 05:39 PM    PHUR 6.5 03/18/2025 05:39 PM    PHUR 5.5 08/28/2013 02:21 AM    WBCUA 0-2 12/27/2024 09:21 PM    RBCUA 0-2 12/27/2024 09:21 PM    BACTERIA 1+ 12/27/2024 09:21 PM    CLARITYU Clear 03/18/2025 05:39 PM    LEUKOCYTESUR Negative 03/18/2025 05:39 PM    UROBILINOGEN 0.2 03/18/2025 05:39 PM    BILIRUBINUR MODERATE 03/18/2025 05:39 PM    BLOODU Negative 03/18/2025 05:39 PM    GLUCOSEU Negative 03/18/2025 05:39 PM    GLUCOSEU Negative 10/28/2011 09:40 AM    KETUA 40 03/18/2025 05:39 PM    AMORPHOUS 1+ 12/27/2024 09:21 PM     Urine Cultures: No results found for: \"LABURIN\"  Blood Cultures: No results found for: \"BC\"  No results found for: \"BLOODCULT2\"  Organism: No results found for: \"ORG\"      Electronically signed by Darlene Martino MD on 3/20/2025 at 7:59 AM

## 2025-03-21 LAB
ALBUMIN SERPL-MCNC: 2.4 G/DL (ref 3.4–5)
ALBUMIN SERPL-MCNC: 2.5 G/DL (ref 3.4–5)
ALBUMIN/GLOB SERPL: 1 {RATIO} (ref 1.1–2.2)
ALP SERPL-CCNC: 57 U/L (ref 40–129)
ALT SERPL-CCNC: 7 U/L (ref 10–40)
ANION GAP SERPL CALCULATED.3IONS-SCNC: 8 MMOL/L (ref 3–16)
ANION GAP SERPL CALCULATED.3IONS-SCNC: 8 MMOL/L (ref 3–16)
AST SERPL-CCNC: 21 U/L (ref 15–37)
BILIRUB SERPL-MCNC: 0.3 MG/DL (ref 0–1)
BUN SERPL-MCNC: 6 MG/DL (ref 7–20)
BUN SERPL-MCNC: 6 MG/DL (ref 7–20)
CALCIUM SERPL-MCNC: 9.2 MG/DL (ref 8.3–10.6)
CALCIUM SERPL-MCNC: 9.2 MG/DL (ref 8.3–10.6)
CHLORIDE SERPL-SCNC: 106 MMOL/L (ref 99–110)
CHLORIDE SERPL-SCNC: 107 MMOL/L (ref 99–110)
CO2 SERPL-SCNC: 26 MMOL/L (ref 21–32)
CO2 SERPL-SCNC: 27 MMOL/L (ref 21–32)
CORTIS SERPL-MCNC: 10.5 UG/DL
CORTIS SERPL-MCNC: 9.7 UG/DL
CREAT SERPL-MCNC: 0.7 MG/DL (ref 0.6–1.2)
CREAT SERPL-MCNC: 0.8 MG/DL (ref 0.6–1.2)
GFR SERPLBLD CREATININE-BSD FMLA CKD-EPI: 81 ML/MIN/{1.73_M2}
GFR SERPLBLD CREATININE-BSD FMLA CKD-EPI: >90 ML/MIN/{1.73_M2}
GLUCOSE SERPL-MCNC: 106 MG/DL (ref 70–99)
GLUCOSE SERPL-MCNC: 107 MG/DL (ref 70–99)
PHOSPHATE SERPL-MCNC: 1.4 MG/DL (ref 2.5–4.9)
PHOSPHATE SERPL-MCNC: 13.4 MG/DL (ref 2.5–4.9)
PHOSPHATE SERPL-MCNC: 2.4 MG/DL (ref 2.5–4.9)
PHOSPHATE SERPL-MCNC: 2.4 MG/DL (ref 2.5–4.9)
POTASSIUM SERPL-SCNC: 3.4 MMOL/L (ref 3.5–5.1)
POTASSIUM SERPL-SCNC: 3.4 MMOL/L (ref 3.5–5.1)
POTASSIUM SERPL-SCNC: 4.3 MMOL/L (ref 3.5–5.1)
POTASSIUM SERPL-SCNC: 4.6 MMOL/L (ref 3.5–5.1)
POTASSIUM SERPL-SCNC: 8.7 MMOL/L (ref 3.5–5.1)
PROT SERPL-MCNC: 4.8 G/DL (ref 6.4–8.2)
SODIUM SERPL-SCNC: 140 MMOL/L (ref 136–145)
SODIUM SERPL-SCNC: 142 MMOL/L (ref 136–145)

## 2025-03-21 PROCEDURE — 6360000002 HC RX W HCPCS: Performed by: INTERNAL MEDICINE

## 2025-03-21 PROCEDURE — 82533 TOTAL CORTISOL: CPT

## 2025-03-21 PROCEDURE — 2500000003 HC RX 250 WO HCPCS: Performed by: INTERNAL MEDICINE

## 2025-03-21 PROCEDURE — 2060000000 HC ICU INTERMEDIATE R&B

## 2025-03-21 PROCEDURE — 82024 ASSAY OF ACTH: CPT

## 2025-03-21 PROCEDURE — 84132 ASSAY OF SERUM POTASSIUM: CPT

## 2025-03-21 PROCEDURE — 36415 COLL VENOUS BLD VENIPUNCTURE: CPT

## 2025-03-21 PROCEDURE — 2580000003 HC RX 258: Performed by: INTERNAL MEDICINE

## 2025-03-21 PROCEDURE — 80053 COMPREHEN METABOLIC PANEL: CPT

## 2025-03-21 PROCEDURE — 6370000000 HC RX 637 (ALT 250 FOR IP): Performed by: INTERNAL MEDICINE

## 2025-03-21 PROCEDURE — 84100 ASSAY OF PHOSPHORUS: CPT

## 2025-03-21 RX ORDER — COSYNTROPIN 0.25 MG/ML
0.25 INJECTION, POWDER, FOR SOLUTION INTRAMUSCULAR; INTRAVENOUS ONCE
Status: COMPLETED | OUTPATIENT
Start: 2025-03-21 | End: 2025-03-21

## 2025-03-21 RX ORDER — PANTOPRAZOLE SODIUM 40 MG/1
40 TABLET, DELAYED RELEASE ORAL
Status: DISCONTINUED | OUTPATIENT
Start: 2025-03-21 | End: 2025-03-22 | Stop reason: HOSPADM

## 2025-03-21 RX ORDER — SODIUM CHLORIDE, SODIUM LACTATE, POTASSIUM CHLORIDE, AND CALCIUM CHLORIDE .6; .31; .03; .02 G/100ML; G/100ML; G/100ML; G/100ML
500 INJECTION, SOLUTION INTRAVENOUS ONCE
Status: DISCONTINUED | OUTPATIENT
Start: 2025-03-21 | End: 2025-03-21

## 2025-03-21 RX ORDER — SODIUM CHLORIDE, SODIUM LACTATE, POTASSIUM CHLORIDE, CALCIUM CHLORIDE 600; 310; 30; 20 MG/100ML; MG/100ML; MG/100ML; MG/100ML
INJECTION, SOLUTION INTRAVENOUS CONTINUOUS
Status: DISCONTINUED | OUTPATIENT
Start: 2025-03-21 | End: 2025-03-21

## 2025-03-21 RX ORDER — COSYNTROPIN 0.25 MG/ML
0.25 INJECTION, POWDER, FOR SOLUTION INTRAMUSCULAR; INTRAVENOUS ONCE
Status: COMPLETED | OUTPATIENT
Start: 2025-03-22 | End: 2025-03-22

## 2025-03-21 RX ADMIN — SODIUM CHLORIDE, PRESERVATIVE FREE 10 ML: 5 INJECTION INTRAVENOUS at 21:18

## 2025-03-21 RX ADMIN — THIAMINE HYDROCHLORIDE 100 MG: 100 INJECTION, SOLUTION INTRAMUSCULAR; INTRAVENOUS at 08:48

## 2025-03-21 RX ADMIN — GABAPENTIN 300 MG: 300 CAPSULE ORAL at 21:17

## 2025-03-21 RX ADMIN — TRAZODONE HYDROCHLORIDE 100 MG: 100 TABLET ORAL at 21:18

## 2025-03-21 RX ADMIN — COSYNTROPIN 0.25 MG: 0.25 INJECTION, POWDER, LYOPHILIZED, FOR SOLUTION INTRAMUSCULAR; INTRAVENOUS at 17:42

## 2025-03-21 RX ADMIN — PANTOPRAZOLE SODIUM 40 MG: 40 TABLET, DELAYED RELEASE ORAL at 08:48

## 2025-03-21 RX ADMIN — SODIUM CHLORIDE, PRESERVATIVE FREE 10 ML: 5 INJECTION INTRAVENOUS at 08:49

## 2025-03-21 RX ADMIN — GABAPENTIN 300 MG: 300 CAPSULE ORAL at 14:27

## 2025-03-21 RX ADMIN — ENOXAPARIN SODIUM 40 MG: 100 INJECTION SUBCUTANEOUS at 08:47

## 2025-03-21 RX ADMIN — THERA TABS 1 TABLET: TAB at 08:48

## 2025-03-21 RX ADMIN — POTASSIUM PHOSPHATE, MONOBASIC AND POTASSIUM PHOSPHATE, DIBASIC 15 MMOL: 224; 236 INJECTION, SOLUTION, CONCENTRATE INTRAVENOUS at 10:31

## 2025-03-21 RX ADMIN — Medication 500 MG: at 17:38

## 2025-03-21 RX ADMIN — FLUOXETINE HYDROCHLORIDE 40 MG: 20 CAPSULE ORAL at 08:48

## 2025-03-21 RX ADMIN — GABAPENTIN 300 MG: 300 CAPSULE ORAL at 08:48

## 2025-03-21 RX ADMIN — POTASSIUM BICARBONATE 40 MEQ: 782 TABLET, EFFERVESCENT ORAL at 04:47

## 2025-03-21 ASSESSMENT — PAIN SCALES - GENERAL
PAINLEVEL_OUTOF10: 0
PAINLEVEL_OUTOF10: 0

## 2025-03-21 NOTE — PROGRESS NOTES
Pt phosphorus and potassium labs came back abnormally high, redraw order in d/t pt receiving KPHOS through IV at the time. Lab labels sent to lab per unit policy.

## 2025-03-21 NOTE — PROGRESS NOTES
Pt evening shift assessment complete. VSS and medication given as ordered. Pt assessed for comfort needs, PIV leaking, new PIV placed, pt tolerated well.  Pt does not express any additional needs at this time, resting comfortably in bed.

## 2025-03-21 NOTE — PROGRESS NOTES
V2.0    [facility] [place]      Name:  Zulay Louis /Age/Sex: 1960  (64 y.o. female)   MRN & CSN:  [id number] & [id number] Encounter Date/Time: 3/21/2025 9:11 AM EDT   Location:  [facility]3519-01 PCP: Marco A Rdz MD     Attending:Darlene Martino MD       Hospital Day: 4    Assessment and Recommendations     Active Hospital Problems    Diagnosis Date Noted    Refeeding syndrome [E87.8] 2025     Priority: High    High anion gap metabolic acidosis [E87.29] 2025     Priority: High    Hypophosphatemia [E83.39] 2025     Priority: High    Severe life threatening hypokalemia [E87.6] 2025     Priority: High    Severe malnutrition [E43] 2025    Peptic stricture of esophagus [K22.2] 2025    Acute gastritis without hemorrhage [K29.00] 2025       Zulay Louis is a 64 y.o. female   PMHx significant for coronary artery disease, essential hypertension, depression, anxiety disorder who presented to ED with a complaint of persistent vomiting over the last several weeks.    Patient states that she has been having almost daily vomiting of multiple episodes of emesis.  Patient indicates that she did have weight loss about 50 pounds.    Patient has been having poor oral intake due to her persistent vomiting patient reports generalized weakness and fatigue.  Patient also report progressive dyspnea on exertion recently.    Patient denies any leg swelling or orthopnea.    Patient denies any fever or chills.  No stomach pain diarrhea constipation.    Labs remarkable for potassium of 2.5.    CT of the abdomen and pelvis was obtained in ED did not show any evidence of acute pathology.    Patient is currently being admitted under inpatient status for further management and evaluation.         Assessment:  Active Hospital Problems    Diagnosis Date Noted    Refeeding syndrome [E87.8] 2025     Priority: High    High anion gap metabolic acidosis [E87.29] 2025

## 2025-03-21 NOTE — CARE COORDINATION
11:06am: Spoke with MD earlier via phone call regarding plan of care and MD confirmed no needs.      Spoke with pt at bedside. Pt able to answer orientation questions and agreeable to speak with writer. She confirmed she plans on returning home when discharged and her sister is her ride home. She continues to decline needs from  for home when discharged. She wants to know when she will leave.     Discussed with Krissy AGARWAL.      will follow    ELIZABETH Bowden   for [facility] ([facility])  [employer] Mobile: [phone]

## 2025-03-21 NOTE — PROGRESS NOTES
RN notified MD, Dr. Martino that patient's phos level was 2.4 at 1434 and patient's blood pressure was 82/55 at 1609.     MD came to bedside. Orthostatics performed.  Patient tachycardic upon standing. PO Kphos ordered. ACTH stimulating labs ordered. Echo ordered.     RN administered Kphos. RN attempt was made to draw labs for ACTH. Baseline lab was drawn and cosyntropin was given. Unable to draw 30 min or 60 min labs. RN notified MD who ordered a redraw for tmw and ordered RN to coordinate with lab in the morning for timed draws. This RN passed information on in report to night RN.

## 2025-03-21 NOTE — PLAN OF CARE
Problem: Safety - Adult  Goal: Free from fall injury  Outcome: Progressing  Note: Orthostatic vital signs obtained at start of shift - see flowsheet for details.  Pt meets criteria for orthostasis.  Pt is a High fall risk. See Sarabia Fall Score and ABCDS Injury Risk assessments.   + Screening for Orthostasis and/or + High Fall Risk per SARABIA/ABCDS: Explained fall risk precautions to pt and family and rationale behind their use to keep the patient safe. Pt bed is in low position, side rails up, call light and belongings are in reach. Fall wristband applied and present on pts wrist.  Bed alarm on.  Pt encouraged to call for assistance. Will continue with hourly rounds for PO intake, pain needs, toileting and repositioning as needed.      Problem: ABCDS Injury Assessment  Goal: Absence of physical injury  Outcome: Progressing  Note: No new reported or observed physical injuries this shift.      Problem: Nutrition Deficit:  Goal: Optimize nutritional status  Outcome: Progressing  Note: Pt eating 50% of each meal.       Psychiatric Discharge Summary    Sveta Cuellar MRN# [id number]   Age: 36 year old YOB: 1987     Date of Admission:  11/16/2023  Date of Discharge:  11/30/2023  Admitting Physician:  Hayes Simpson MD  Discharge Physician:  Hayes Simpson MD (Contact: [phone])         Event Leading to Hospitalization:         Depression and Suicidal ideation.     History of present illness: per DEC 's note: Sveta Cuellar presents to the ED via EMS. Patient is presenting to the ED for the following concerns: Suicidal ideation, Intoxication. Factors that make the mental health crisis life threatening or complex are: Pt out of IP  about a week; new meds were started when pt went to IP  since she had taken excess of Rx Trazadone; patient ingested alcohol tonight. Pt said she did not have a plan to end life but does ask multiple times if she's going back into IP .  She says she does not feel safe to go home at time of note. Pt says she found out today that a significant other does not take relationship as seriously as she and some additional upsetting information.  She called a friend and decided she needed to come to ED as she had suicidal thoughts. Pt reports no plan and that she thinks she needs an IRTS as living alone doesn't seem to allow the appropriate level of care patient needs to stay emotionally regulated and safe. Pt has PMH dx to include MDD BPD and PTSD. Pt has hx of housing instabilty but reports she has had her apartment since January of this year. Pt's therapist recommended pt think about IRTS level of care and pt agrees she needs a higher level of care going forward. As noted above, pt states she does not feel safe to leave ED at this time. Since she is forward thinking, has no plan and did consume alcohol, patient will be administered her meds, allowed to sleep in ED and reassessed for discharge option in the morning.      During visit with this provider patient  presented as pleasant and cooperative. She was soft spoken with limited eye contact and looked depressed and anxious. She confirmed that she found out that her SO had a relationship with Sveta's friend, felt betrayed and started thinking of suicide. Sveta reported disrupted sleep, but minimal changes in her appetite. Admitted having frequent thoughts of suicide that she revealed to her providers. Her therapist suggested that she gets hospitalized for stabilization and, then, goes to IRTS. Sveta agreed with her. Patient reports recent hospitalization on a mental floor in [facility] after a Suicide attempt with medication overdose. States that discharge was only 2-3 weeks ago. She does have providers: psychiatric prescriber, regular and DBT therapists (patient participates in virtual DBT groups 3 times per week). She reports being fully compliant with her meds. Admits to smoking weed 2-3 times per week and denies using heavy drugs or abusing alcohol (patient's UDS was negative for all screened substances).     See Admission note by by admitting physician/nurse-practitioner Dr. Hayes Simpson MD for additional details.          Diagnoses:     1. Major depressive disorder, recurrent, severe without psychotic features.  2. Generalized anxiety disorder.  3. Borderline personality disorder by history.   4. PTSD.         Labs:      Latest Reference Range & Units Most Recent 11/29/23 16:54 11/30/23 08:10   Sodium 135 - 145 mmol/L 141  10/21/23 20:10     Potassium 3.4 - 5.3 mmol/L 3.8  10/21/23 20:10     Chloride 98 - 107 mmol/L 105  10/21/23 20:10     Carbon Dioxide (CO2) 22 - 29 mmol/L 23  10/21/23 20:10     Urea Nitrogen 6.0 - 20.0 mg/dL 10.5  10/21/23 20:10     Creatinine 0.51 - 0.95 mg/dL 0.81  10/21/23 20:10     GFR Estimate >60 mL/min/1.73m2 >90  10/21/23 20:10     Calcium 8.6 - 10.0 mg/dL 9.8  10/21/23 20:10     Anion Gap 7 - 15 mmol/L 13  10/21/23 20:10     Albumin 3.5 - 5.2 g/dL 4.1  10/21/23 20:10      Protein Total 6.4 - 8.3 g/dL 6.6  10/21/23 20:10     Alkaline Phosphatase 35 - 104 U/L 63  10/21/23 20:10     ALT 0 - 50 U/L 23  10/21/23 20:10     AST 0 - 45 U/L 18  10/21/23 20:10     Bilirubin Total <=1.2 mg/dL <0.2  10/21/23 20:10     Cholesterol <200 mg/dL 215 (H)  11/17/23 07:57     Glucose 70 - 99 mg/dL 112 (H)  10/21/23 20:10     HCG Qual Urine Negative  Negative  11/15/23 11:37     HCG Qualitative Serum Negative  Negative  6/16/23 18:41     HDL Cholesterol >=50 mg/dL 32 (L)  11/17/23 07:57     Hemoglobin A1C <5.7 % 6.0 (H)  11/17/23 07:57     LDL Cholesterol Calculated <=100 mg/dL 132 (H)  11/17/23 07:57     Lipase 13 - 60 U/L 23  6/16/23 18:41     Non HDL Cholesterol <130 mg/dL 183 (H)  11/17/23 07:57     Triglycerides <150 mg/dL 253 (H)  11/17/23 07:57     TSH 0.30 - 4.20 uIU/mL 3.30  10/21/23 02:11     GLUCOSE BY METER POCT 70 - 99 mg/dL 114 (H)  11/30/23 08:10 152 (H) 114 (H)   WBC 4.0 - 11.0 10e3/uL 9.4  10/21/23 20:10     Hemoglobin 11.7 - 15.7 g/dL 11.1 (L)  10/21/23 20:10     Hematocrit 35.0 - 47.0 % 35.9  10/21/23 20:10     Platelet Count 150 - 450 10e3/uL 228  10/21/23 20:10     RBC Count 3.80 - 5.20 10e6/uL 4.29  10/21/23 20:10     MCV 78 - 100 fL 84  10/21/23 20:10     MCH 26.5 - 33.0 pg 25.9 (L)  10/21/23 20:10     MCHC 31.5 - 36.5 g/dL 30.9 (L)  10/21/23 20:10     RDW 10.0 - 15.0 % 15.0  10/21/23 20:10     % Neutrophils % 71  10/21/23 20:10     % Lymphocytes % 20  10/21/23 20:10     % Monocytes % 6  10/21/23 20:10     % Eosinophils % 3  10/21/23 20:10     % Basophils % 0  10/21/23 20:10     Absolute Basophils 0.0 - 0.2 10e3/uL 0.0  10/21/23 20:10     Absolute Eosinophils 0.0 - 0.7 10e3/uL 0.3  10/21/23 20:10     Absolute Immature Granulocytes <=0.4 10e3/uL 0.0  10/21/23 20:10     Absolute Lymphocytes 0.8 - 5.3 10e3/uL 1.9  10/21/23 20:10     Absolute Monocytes 0.0 - 1.3 10e3/uL 0.5  10/21/23 20:10     % Immature Granulocytes % 0  10/21/23 20:10     Absolute Neutrophils 1.6 - 8.3 10e3/uL  6.6  10/21/23 20:10     Absolute NRBCs 10e3/uL 0.0  10/21/23 20:10     NRBCs per 100 WBC <1 /100 0  10/21/23 20:10     Color Urine Colorless, Straw, Light Yellow, Yellow  Yellow  6/16/23 18:42     Appearance Urine Clear  Slightly Cloudy !  6/16/23 18:42     Glucose Urine Negative mg/dL Negative  6/16/23 18:42     Bilirubin Urine Negative  Negative  6/16/23 18:42     Ketones Urine Negative mg/dL Negative  6/16/23 18:42     Specific Gravity Urine 1.003 - 1.035  1.014  6/16/23 18:42     pH Urine 5.0 - 7.0  6.0  6/16/23 18:42     Protein Albumin Urine Negative mg/dL Negative  6/16/23 18:42     Urobilinogen mg/dL Normal, 2.0 mg/dL Normal  6/16/23 18:42     Nitrite Urine Negative  Negative  6/16/23 18:42     Blood Urine Negative  Negative  6/16/23 18:42     Leukocyte Esterase Urine Negative  Moderate !  6/16/23 18:42     WBC Urine <=5 /HPF 20 (H)  6/16/23 18:42     RBC Urine <=2 /HPF 4 (H)  6/16/23 18:42     Squamous Epithelial /HPF Urine <=1 /HPF 2 (H)  6/16/23 18:42     Mucus Urine None Seen /LPF Present !  6/16/23 18:42     URINE CULTURE  Rpt  6/16/23 18:42     SARS CoV2 PCR Negative  Negative  11/15/23 11:11     Influenza A Negative  Negative  11/15/23 11:11     Influenza B Negative  Negative  11/15/23 11:11     Resp Syncytial Virus Negative  Negative  11/15/23 11:11     Lithium Level 0.60 - 1.20 mmol/L 0.65  10/22/23 18:52     Salicylate mg/dL <0.3  10/21/23 02:11     Amphetamine Qual Urine Screen Negative  Screen Negative  11/15/23 11:35     Fentanyl Qual Urine Screen Negative  Screen Negative  11/15/23 11:35     Cocaine Urine Screen Negative  Screen Negative  11/15/23 11:35     Benzodiazepine Urine Screen Negative  Screen Negative  11/15/23 11:35     Opiates Qualitative Urine Screen Negative  Screen Negative  11/15/23 11:35     PCP Urine Screen Negative  Screen Negative  11/15/23 11:35     Cannabinoids Qual Urine Screen Negative  Screen Negative  11/15/23 11:35     Barbiturates Qual Urine Screen Negative  Screen  Negative  11/15/23 11:35     CT ABDOMEN PELVIS W CONTRAST  Rpt  6/16/23 19:54     CT CHEST PULMONARY EMBOLISM W CONTRAST  Rpt  9/15/23 16:32     EKG 12-LEAD, TRACING ONLY  Rpt  10/21/23 02:10     Alcohol ethyl <=0.01 g/dL <0.01  10/21/23 02:11     Acetaminophen 10.0 - 30.0 ug/mL <5.0 (L)  10/21/23 02:11     (H): Data is abnormally high  (L): Data is abnormally low  !: Data is abnormal  Rpt: View report in Results Review for more information         Consults:   No consultations were requested during this admission.         Hospital Course:   Sveta Cuellar was admitted to [facility] with attending Hayes Simpson MD voluntarily. The patient was placed under status 15 (15 minute checks) to ensure patient safety. The patient presented initially as very anxious and depressed but slowly became more engaging and talkative. She was overall very cooperative during her stay at the hospital and was also very active throughout the unit, always present in the milieu, socializing with peers, spending time working on puzzles, and attending groups. She noted from the very start of her admission that she did not feel safe returning to her home as she would be alone and would allow her thoughts to get to her. She used puzzling as a coping skill to distract her from any passive suicidal ideation which she continued to endorse throughout her stay. She denied any suicidal ideation with a plan to harm herself. Sveta otherwise denied any other mental health symptoms such as visual or auditory hallucinations. During her stay, some medication changes were made, including: increased Cymbalta to 60 mg daily for depression and started gabapentin 300 mg at bedtime for sleep and melatonin 3 mg. Changes were helpful. She otherwise continued on her PTA medications. Furthermore, she was excited to hear once she had been accepted into [employer] [facility] in [employer].     Sveta Cuellar did participate in groups and was  visible in the milieu.     The patient's symptoms of depression and suicidal ideation improved.     Sveta Cuellar was discharged to [facility]. At the time of discharge Sveta Cuellar was determined to not be a danger to herself or others.          Discharge Medications:     Discharge Medication List as of 11/30/2023  9:01 AM        START taking these medications    Details   gabapentin (NEURONTIN) 300 MG capsule Take 1 capsule (300 mg) by mouth at bedtime, Disp-30 capsule, R-1, E-Prescribe      melatonin 3 MG tablet Take 1 tablet (3 mg) by mouth nightly as needed for sleep, Disp-30 tablet, R-1, E-Prescribe           CONTINUE these medications which have CHANGED    Details   atorvastatin (LIPITOR) 20 MG tablet Take 1 tablet (20 mg) by mouth at bedtime, Disp-30 tablet, R-0, E-Prescribe      DULoxetine (CYMBALTA) 60 MG capsule Take 1 capsule (60 mg) by mouth daily, Disp-30 capsule, R-1, E-Prescribe      hydrOXYzine (ATARAX) 25 MG tablet Take 1-2 tablets (25-50 mg) by mouth every 6 hours as needed for anxiety, Disp-90 tablet, R-1, E-Prescribe      metFORMIN (GLUCOPHAGE XR) 500 MG 24 hr tablet Take 1 tablet (500 mg) by mouth daily, Disp-30 tablet, R-0, E-Prescribe      risperiDONE (RISPERDAL M-TABS) 0.5 MG ODT Take 1 tablet (0.5 mg) by mouth daily, Disp-30 tablet, R-1, E-Prescribe           CONTINUE these medications which have NOT CHANGED    Details   Melatonin 3 MG TBDP Take 1 tablet by mouth at bedtime, Historical           STOP taking these medications       doxepin (SINEQUAN) 10 MG capsule Comments:   Reason for Stopping:                    Psychiatric Examination:   Appearance: awake, alert, dressed in home clothes, and morbidly obese  Attitude: cooperative  Eye Contact: fair, improved  Mood: anxious, but overall improved  Affect: mood congruent  Speech: more talkative  Psychomotor Behavior: no evidence of tardive dyskinesia, dystonia, or tics and physical retardation  Throught Process: logical, linear, and  goal oriented  Associations: no loose associations  Thought Content: no evidence of psychotic thought and passive suicidal ideation present off and on, not over last days of hospitalization;  Insight: partial  Judgement: fair  Oriented to: time, person, and place  Attention Span and Concentration: fair  Recent and Remote Memory: fair         Discharge Plan:     Health Care Follow-up:   Psychiatry appointment: Wednesday, December 6, 2023 @ 2pm via Telehealth  Provider: Nkechi Hendrix NP  Location: [address] [address]  Phone: [phone]  Fax: [phone]  Instructions: A link to your appointment will be sent via email ([email or web address]) and is accessible through [employer] as well.  [facility] Registration will also call you @ [phone] the week of your appointment for confirmation.  HUC TO FAX AVS to above appointment, please     :  Name/Organization: [facility]  Phone: [phone]     Information will be faxed to your outpatient providers to ensure a healthy continuity of care for you.      Attend all scheduled appointments with your outpatient providers. Call at least 24 hours in advance if you need to reschedule an appointment to ensure continued access to your outpatient providers.        Attestation:  The patient has been seen and evaluated by me, Hayes Simpson MD.    Total time spent was 46 minutes. Over 50% of times was spent counseling and coordination of care regarding coping skills, medication and discharge planning.      I was present with PA student who participated in the service and in the documentation of the note. I have verified the history and personally performed the physical exam and medical decision making. I agree with the assessment and plan of care as documented in the note.     Hayes Simpson MD  [facility] Psychiatry

## 2025-03-21 NOTE — PLAN OF CARE
Problem: Safety - Adult  Goal: Free from fall injury  3/20/2025 2109 by Selina Chavez RN  Outcome: Progressing  Flowsheets (Taken 3/20/2025 1940)  Free From Fall Injury: Instruct family/caregiver on patient safety  3/20/2025 1654 by Krissy Robles RN  Outcome: Progressing     Problem: ABCDS Injury Assessment  Goal: Absence of physical injury  3/20/2025 2109 by Selina Chavez RN  Outcome: Progressing  Flowsheets (Taken 3/20/2025 1940)  Absence of Physical Injury: Implement safety measures based on patient assessment  3/20/2025 1654 by Krissy Robles RN  Outcome: Progressing     Problem: Metabolic/Fluid and Electrolytes - Adult  Goal: Electrolytes maintained within normal limits  Recent Flowsheet Documentation  Taken 3/20/2025 1940 by Selina Chavez RN  Electrolytes maintained within normal limits: Administer electrolyte replacement as ordered  3/20/2025 1654 by Krissy Robles RN  Outcome: Not Progressing

## 2025-03-22 VITALS
BODY MASS INDEX: 29.92 KG/M2 | OXYGEN SATURATION: 97 % | DIASTOLIC BLOOD PRESSURE: 66 MMHG | RESPIRATION RATE: 21 BRPM | SYSTOLIC BLOOD PRESSURE: 104 MMHG | WEIGHT: 179.6 LBS | HEART RATE: 96 BPM | HEIGHT: 65 IN | TEMPERATURE: 97.7 F

## 2025-03-22 LAB
ALBUMIN SERPL-MCNC: 2.7 G/DL (ref 3.4–5)
ALBUMIN/GLOB SERPL: 1.1 {RATIO} (ref 1.1–2.2)
ALP SERPL-CCNC: 58 U/L (ref 40–129)
ALT SERPL-CCNC: 7 U/L (ref 10–40)
ANION GAP SERPL CALCULATED.3IONS-SCNC: 7 MMOL/L (ref 3–16)
AST SERPL-CCNC: 17 U/L (ref 15–37)
BILIRUB SERPL-MCNC: <0.2 MG/DL (ref 0–1)
BUN SERPL-MCNC: 9 MG/DL (ref 7–20)
CALCIUM SERPL-MCNC: 9.3 MG/DL (ref 8.3–10.6)
CHLORIDE SERPL-SCNC: 105 MMOL/L (ref 99–110)
CO2 SERPL-SCNC: 27 MMOL/L (ref 21–32)
CORTIS 1H P 250 UG ACTH RIV SERPL-MCNC: 31.8 UG/DL
CORTIS 30M P 250 UG ACTH RIV SERPL-MCNC: 20.9 UG/DL
CORTIS SERPL-MCNC: 15.4 UG/DL
CREAT SERPL-MCNC: 0.7 MG/DL (ref 0.6–1.2)
GFR SERPLBLD CREATININE-BSD FMLA CKD-EPI: >90 ML/MIN/{1.73_M2}
GLUCOSE SERPL-MCNC: 142 MG/DL (ref 70–99)
PHOSPHATE SERPL-MCNC: 1.6 MG/DL (ref 2.5–4.9)
PHOSPHATE SERPL-MCNC: 1.7 MG/DL (ref 2.5–4.9)
POTASSIUM SERPL-SCNC: 4.5 MMOL/L (ref 3.5–5.1)
PROT SERPL-MCNC: 5.1 G/DL (ref 6.4–8.2)
SODIUM SERPL-SCNC: 139 MMOL/L (ref 136–145)

## 2025-03-22 PROCEDURE — 80053 COMPREHEN METABOLIC PANEL: CPT

## 2025-03-22 PROCEDURE — 6360000002 HC RX W HCPCS: Performed by: INTERNAL MEDICINE

## 2025-03-22 PROCEDURE — 6370000000 HC RX 637 (ALT 250 FOR IP): Performed by: INTERNAL MEDICINE

## 2025-03-22 PROCEDURE — 36415 COLL VENOUS BLD VENIPUNCTURE: CPT

## 2025-03-22 PROCEDURE — 80400 ACTH STIMULATION PANEL: CPT

## 2025-03-22 PROCEDURE — 82024 ASSAY OF ACTH: CPT

## 2025-03-22 PROCEDURE — 84100 ASSAY OF PHOSPHORUS: CPT

## 2025-03-22 PROCEDURE — 2580000003 HC RX 258: Performed by: NURSE PRACTITIONER

## 2025-03-22 PROCEDURE — 2500000003 HC RX 250 WO HCPCS: Performed by: NURSE PRACTITIONER

## 2025-03-22 PROCEDURE — 2500000003 HC RX 250 WO HCPCS: Performed by: INTERNAL MEDICINE

## 2025-03-22 RX ORDER — PANTOPRAZOLE SODIUM 40 MG/1
40 TABLET, DELAYED RELEASE ORAL
Qty: 30 TABLET | Refills: 2 | Status: SHIPPED | OUTPATIENT
Start: 2025-03-22

## 2025-03-22 RX ORDER — PANTOPRAZOLE SODIUM 40 MG/1
40 TABLET, DELAYED RELEASE ORAL
Qty: 30 TABLET | Refills: 2 | Status: SHIPPED | OUTPATIENT
Start: 2025-03-22 | End: 2025-03-22

## 2025-03-22 RX ADMIN — Medication 500 MG: at 10:51

## 2025-03-22 RX ADMIN — SODIUM PHOSPHATE, MONOBASIC, MONOHYDRATE AND SODIUM PHOSPHATE, DIBASIC, ANHYDROUS 15 MMOL: 142; 276 INJECTION, SOLUTION INTRAVENOUS at 06:14

## 2025-03-22 RX ADMIN — GABAPENTIN 300 MG: 300 CAPSULE ORAL at 08:12

## 2025-03-22 RX ADMIN — THIAMINE HYDROCHLORIDE 100 MG: 100 INJECTION, SOLUTION INTRAMUSCULAR; INTRAVENOUS at 08:18

## 2025-03-22 RX ADMIN — ENOXAPARIN SODIUM 40 MG: 100 INJECTION SUBCUTANEOUS at 08:16

## 2025-03-22 RX ADMIN — THERA TABS 1 TABLET: TAB at 08:12

## 2025-03-22 RX ADMIN — PANTOPRAZOLE SODIUM 40 MG: 40 TABLET, DELAYED RELEASE ORAL at 06:14

## 2025-03-22 RX ADMIN — COSYNTROPIN 0.25 MG: 0.25 INJECTION, POWDER, LYOPHILIZED, FOR SOLUTION INTRAMUSCULAR; INTRAVENOUS at 04:31

## 2025-03-22 RX ADMIN — SODIUM CHLORIDE, PRESERVATIVE FREE 10 ML: 5 INJECTION INTRAVENOUS at 08:19

## 2025-03-22 RX ADMIN — FLUOXETINE HYDROCHLORIDE 40 MG: 20 CAPSULE ORAL at 08:12

## 2025-03-22 NOTE — PLAN OF CARE
Problem: Safety - Adult  Goal: Free from fall injury  Outcome: Progressing     Problem: ABCDS Injury Assessment  Goal: Absence of physical injury  Outcome: Progressing     Problem: Nutrition Deficit:  Goal: Optimize nutritional status  Outcome: Progressing     Problem: Metabolic/Fluid and Electrolytes - Adult  Goal: Electrolytes maintained within normal limits  Outcome: Progressing

## 2025-03-22 NOTE — PLAN OF CARE
Problem: Safety - Adult  Goal: Free from fall injury  3/22/2025 1107 by Natalie Mann, RN  Outcome: Progressing  Flowsheets (Taken 3/22/2025 1107)  Free From Fall Injury: Instruct family/caregiver on patient safety  Note: Patient in bed with bed alarm on, instructed to call for assistance, verbalizes understanding. Fall precautions in place.       Problem: ABCDS Injury Assessment  Goal: Absence of physical injury  3/22/2025 1107 by Natalie Mann RN  Outcome: Progressing  Flowsheets (Taken 3/22/2025 1107)  Absence of Physical Injury: Implement safety measures based on patient assessment     Problem: Nutrition Deficit:  Goal: Optimize nutritional status  3/22/2025 1107 by Natalie Mann RN  Outcome: Progressing  Flowsheets (Taken 3/22/2025 1107)  Nutrient intake appropriate for improving, restoring, or maintaining nutritional needs:   Assess nutritional status and recommend course of action   Monitor oral intake, labs, and treatment plans   Recommend appropriate diets, oral nutritional supplements, and vitamin/mineral supplements   Provide specific nutrition education to patient or family as appropriate     Problem: Metabolic/Fluid and Electrolytes - Adult  Goal: Electrolytes maintained within normal limits  3/22/2025 1107 by Natalie Mann, RN  Outcome: Progressing  Flowsheets (Taken 3/22/2025 1107)  Electrolytes maintained within normal limits:   Administer electrolyte replacement as ordered   Monitor response to electrolyte replacements, including repeat lab results as appropriate   Monitor labs and assess patient for signs and symptoms of electrolyte imbalances

## 2025-03-22 NOTE — DISCHARGE SUMMARY
V2.0  Discharge Summary    Name:  Zulay Louis /Age/Sex: 1960 (64 y.o. female)   Admit Date: 3/18/2025  Discharge Date: 3/22/25    MRN & CSN:  [id number] & [id number] Encounter Date and Time 3/22/25 10:15 AM EDT    Attending:  Darlene Martino MD Discharging Provider: Darlene Martino MD       Hospital Course:     Zulay Louis is a 64 y.o. female   PMHx significant for coronary artery disease, essential hypertension, depression, anxiety disorder who presented to ED with a complaint of persistent vomiting over the last several weeks.    Patient states that she has been having almost daily vomiting of multiple episodes of emesis.  Patient indicates that she did have weight loss about 50 pounds.    Patient has been having poor oral intake due to her persistent vomiting patient reports generalized weakness and fatigue.    Patient also report progressive dyspnea on exertion recently.    Patient denies any leg swelling or orthopnea.    Patient denies any fever or chills.  No stomach pain diarrhea constipation.    Labs remarkable for potassium of 2.5.    CT of the abdomen and pelvis was obtained in ED did not show any evidence of acute pathology.    Patient is currently being admitted under inpatient status for further management and evaluation.          Assessment:  Active Hospital Problems    Diagnosis Date Noted    Refeeding syndrome [E87.8] 2025     Priority: High    High anion gap metabolic acidosis [E87.29] 2025     Priority: High    Hypophosphatemia [E83.39] 2025     Priority: High    Severe life threatening hypokalemia [E87.6] 2025     Priority: High    Severe malnutrition [E43] 2025    Peptic stricture of esophagus [K22.2] 2025    Acute gastritis without hemorrhage [K29.00] 2025        Nausea vomiting, Solid and liquid food Dysphagia unable to tolerate PO intake, EGD with tight stricture suspected Peptic stricture, needing esophageal balloon  09:21 PM    RBCUA 0-2 12/27/2024 09:21 PM    BACTERIA 1+ 12/27/2024 09:21 PM    CLARITYU Clear 03/18/2025 05:39 PM    LEUKOCYTESUR Negative 03/18/2025 05:39 PM    UROBILINOGEN 0.2 03/18/2025 05:39 PM    BILIRUBINUR MODERATE 03/18/2025 05:39 PM    BLOODU Negative 03/18/2025 05:39 PM    GLUCOSEU Negative 03/18/2025 05:39 PM    GLUCOSEU Negative 10/28/2011 09:40 AM    KETUA 40 03/18/2025 05:39 PM    AMORPHOUS 1+ 12/27/2024 09:21 PM     Urine Cultures: No results found for: \"LABURIN\"  Blood Cultures: No results found for: \"BC\"  No results found for: \"BLOODCULT2\"  Organism: No results found for: \"ORG\"    Time Spent Discharging patient *** minutes    Electronically signed by Darlene Martino MD on 3/22/2025 at 10:30 AM

## 2025-03-22 NOTE — CARE COORDINATION
Case Management Assessment            Discharge Note                    Date / Time of Note: 3/22/2025 11:05 AM                  Discharge Note Completed by: MARYELLEN Paul    Patient Name: Zulay Louis   YOB: 1960  Diagnosis: Hypokalemia [E87.6]  Nausea and vomiting, unspecified vomiting type [R11.2]   Date / Time: 3/18/2025  4:41 PM    Current PCP: Marco A Rdz MD  Clinic patient: No    Hospitalization in the last 30 days: No       Advance Directives:  Code Status: Full Code  [place] DNR form completed and on chart: Not Indicated    Financial:  Payor: [facility] MEDICARE / Plan: [facility] MEDICARE ADVANTAGE / Product Type: *No Product type* /      Pharmacy:    [address] [phone] - F [phone]  [address] [place] [address]  Phone: [phone] Fax: [phone]      Assistance purchasing medications?: Potential Assistance Purchasing Medications: No  Assistance provided by Case Management: None at this time    Does patient want to participate in local refill/ meds to beds program?: Yes    Meds To Beds General Rules:  1. Can ONLY be done Monday- Friday between 8:30am-5pm  2. Prescription(s) must be in pharmacy by 3pm to be filled same day  3.Copy of patient's insurance/ prescription drug card and patient face sheet must be sent along with the prescription(s)  4. Cost of Rx cannot be added to hospital bill. If financial assistance is needed, please contact unit  or ;  or  CANNOT provide pharmacy voucher for patients co-pays  5. Patients can then  the prescription on their way out of the hospital at discharge, or pharmacy can deliver to the bedside if staff is available. (payment due at time of pick-up or delivery - cash, check, or card accepted)     Able to afford home medications/ co-pay costs: Yes    ADLS:  Current PT AM-PAC Score:   /24  Current OT AM-PAC Score:

## 2025-03-26 LAB
ACTH PLAS-MCNC: 2 PG/ML (ref 7–63)
ACTH PLAS-MCNC: 23 PG/ML (ref 7–63)

## 2025-05-14 ENCOUNTER — APPOINTMENT (OUTPATIENT)
Dept: GENERAL RADIOLOGY | Age: 65
End: 2025-05-14
Payer: MEDICARE

## 2025-05-14 ENCOUNTER — HOSPITAL ENCOUNTER (EMERGENCY)
Age: 65
Discharge: HOME | End: 2025-05-14
Attending: EMERGENCY MEDICINE
Payer: MEDICARE

## 2025-05-14 VITALS
TEMPERATURE: 98 F | DIASTOLIC BLOOD PRESSURE: 69 MMHG | WEIGHT: 165 LBS | HEART RATE: 68 BPM | OXYGEN SATURATION: 96 % | RESPIRATION RATE: 18 BRPM | SYSTOLIC BLOOD PRESSURE: 108 MMHG | HEIGHT: 65 IN | BODY MASS INDEX: 27.49 KG/M2

## 2025-05-14 DIAGNOSIS — R53.83 OTHER FATIGUE: Primary | ICD-10-CM

## 2025-05-14 DIAGNOSIS — R11.2 NAUSEA AND VOMITING, UNSPECIFIED VOMITING TYPE: ICD-10-CM

## 2025-05-14 LAB
ALBUMIN SERPL-MCNC: 3.6 G/DL (ref 3.4–5)
ALP SERPL-CCNC: 77 U/L (ref 40–129)
ALT SERPL-CCNC: 15 U/L (ref 10–40)
ANION GAP SERPL CALCULATED.3IONS-SCNC: 13 MMOL/L (ref 3–16)
ANION GAP SERPL CALCULATED.3IONS-SCNC: 20 MMOL/L (ref 3–16)
AST SERPL-CCNC: 26 U/L (ref 15–37)
BACTERIA URNS QL MICRO: ABNORMAL /HPF
BASOPHILS # BLD: 0 K/UL (ref 0–0.2)
BASOPHILS NFR BLD: 0.6 %
BILIRUB DIRECT SERPL-MCNC: 0.2 MG/DL (ref 0–0.3)
BILIRUB INDIRECT SERPL-MCNC: 0.4 MG/DL (ref 0–1)
BILIRUB SERPL-MCNC: 0.6 MG/DL (ref 0–1)
BILIRUB UR QL STRIP.AUTO: ABNORMAL
BUN SERPL-MCNC: 5 MG/DL (ref 7–20)
BUN SERPL-MCNC: 6 MG/DL (ref 7–20)
CALCIUM SERPL-MCNC: 11 MG/DL (ref 8.3–10.6)
CALCIUM SERPL-MCNC: 9.1 MG/DL (ref 8.3–10.6)
CHLORIDE SERPL-SCNC: 104 MMOL/L (ref 99–110)
CHLORIDE SERPL-SCNC: 98 MMOL/L (ref 99–110)
CLARITY UR: CLEAR
CO2 SERPL-SCNC: 22 MMOL/L (ref 21–32)
CO2 SERPL-SCNC: 23 MMOL/L (ref 21–32)
COLOR UR: YELLOW
CREAT SERPL-MCNC: 0.7 MG/DL (ref 0.6–1.2)
CREAT SERPL-MCNC: 0.8 MG/DL (ref 0.6–1.2)
DEPRECATED RDW RBC AUTO: 18.6 % (ref 12.4–15.4)
EKG ATRIAL RATE: 68 BPM
EKG DIAGNOSIS: NORMAL
EKG P AXIS: 49 DEGREES
EKG P-R INTERVAL: 168 MS
EKG Q-T INTERVAL: 404 MS
EKG QRS DURATION: 96 MS
EKG QTC CALCULATION (BAZETT): 429 MS
EKG R AXIS: 10 DEGREES
EKG T AXIS: 3 DEGREES
EKG VENTRICULAR RATE: 68 BPM
EOSINOPHIL # BLD: 0.1 K/UL (ref 0–0.6)
EOSINOPHIL NFR BLD: 1.3 %
EPI CELLS #/AREA URNS HPF: ABNORMAL /HPF (ref 0–5)
GFR SERPLBLD CREATININE-BSD FMLA CKD-EPI: 81 ML/MIN/{1.73_M2}
GFR SERPLBLD CREATININE-BSD FMLA CKD-EPI: >90 ML/MIN/{1.73_M2}
GLUCOSE SERPL-MCNC: 60 MG/DL (ref 70–99)
GLUCOSE SERPL-MCNC: 77 MG/DL (ref 70–99)
GLUCOSE UR STRIP.AUTO-MCNC: NEGATIVE MG/DL
HCT VFR BLD AUTO: 37.6 % (ref 36–48)
HGB BLD-MCNC: 12.6 G/DL (ref 12–16)
HGB UR QL STRIP.AUTO: NEGATIVE
HYALINE CASTS #/AREA URNS LPF: ABNORMAL /LPF (ref 0–2)
KETONES UR STRIP.AUTO-MCNC: 40 MG/DL
LEUKOCYTE ESTERASE UR QL STRIP.AUTO: NEGATIVE
LIPASE SERPL-CCNC: 18 U/L (ref 13–60)
LYMPHOCYTES # BLD: 1.6 K/UL (ref 1–5.1)
LYMPHOCYTES NFR BLD: 29.3 %
MAGNESIUM SERPL-MCNC: 1.6 MG/DL (ref 1.8–2.4)
MCH RBC QN AUTO: 28.5 PG (ref 26–34)
MCHC RBC AUTO-ENTMCNC: 33.4 G/DL (ref 31–36)
MCV RBC AUTO: 85.1 FL (ref 80–100)
MONOCYTES # BLD: 0.5 K/UL (ref 0–1.3)
MONOCYTES NFR BLD: 9 %
NEUTROPHILS # BLD: 3.2 K/UL (ref 1.7–7.7)
NEUTROPHILS NFR BLD: 59.8 %
NITRITE UR QL STRIP.AUTO: NEGATIVE
PH UR STRIP.AUTO: 6 [PH] (ref 5–8)
PLATELET # BLD AUTO: 214 K/UL (ref 135–450)
PMV BLD AUTO: 9.9 FL (ref 5–10.5)
POTASSIUM SERPL-SCNC: 2.9 MMOL/L (ref 3.5–5.1)
POTASSIUM SERPL-SCNC: 3 MMOL/L (ref 3.5–5.1)
POTASSIUM SERPL-SCNC: 5 MMOL/L (ref 3.5–5.1)
PROT SERPL-MCNC: 6.5 G/DL (ref 6.4–8.2)
PROT UR STRIP.AUTO-MCNC: ABNORMAL MG/DL
RBC # BLD AUTO: 4.42 M/UL (ref 4–5.2)
RBC #/AREA URNS HPF: ABNORMAL /HPF (ref 0–4)
RENAL EPI CELLS #/AREA UR COMP ASSIST: ABNORMAL /HPF (ref 0–1)
SODIUM SERPL-SCNC: 139 MMOL/L (ref 136–145)
SODIUM SERPL-SCNC: 141 MMOL/L (ref 136–145)
SP GR UR STRIP.AUTO: 1.02 (ref 1–1.03)
TROPONIN, HIGH SENSITIVITY: 15 NG/L (ref 0–14)
TROPONIN, HIGH SENSITIVITY: 16 NG/L (ref 0–14)
UA COMPLETE W REFLEX CULTURE PNL UR: ABNORMAL
UA DIPSTICK W REFLEX MICRO PNL UR: YES
URN SPEC COLLECT METH UR: ABNORMAL
UROBILINOGEN UR STRIP-ACNC: 1 E.U./DL
WBC # BLD AUTO: 5.4 K/UL (ref 4–11)
WBC #/AREA URNS HPF: ABNORMAL /HPF (ref 0–5)

## 2025-05-14 PROCEDURE — 93005 ELECTROCARDIOGRAM TRACING: CPT

## 2025-05-14 PROCEDURE — 80048 BASIC METABOLIC PNL TOTAL CA: CPT

## 2025-05-14 PROCEDURE — 99285 EMERGENCY DEPT VISIT HI MDM: CPT

## 2025-05-14 PROCEDURE — 6370000000 HC RX 637 (ALT 250 FOR IP): Performed by: PHYSICIAN ASSISTANT

## 2025-05-14 PROCEDURE — 81001 URINALYSIS AUTO W/SCOPE: CPT

## 2025-05-14 PROCEDURE — 2580000003 HC RX 258

## 2025-05-14 PROCEDURE — 96365 THER/PROPH/DIAG IV INF INIT: CPT

## 2025-05-14 PROCEDURE — 83735 ASSAY OF MAGNESIUM: CPT

## 2025-05-14 PROCEDURE — 84484 ASSAY OF TROPONIN QUANT: CPT

## 2025-05-14 PROCEDURE — 71046 X-RAY EXAM CHEST 2 VIEWS: CPT

## 2025-05-14 PROCEDURE — 80076 HEPATIC FUNCTION PANEL: CPT

## 2025-05-14 PROCEDURE — 96366 THER/PROPH/DIAG IV INF ADDON: CPT

## 2025-05-14 PROCEDURE — 84132 ASSAY OF SERUM POTASSIUM: CPT

## 2025-05-14 PROCEDURE — 6360000002 HC RX W HCPCS: Performed by: PHYSICIAN ASSISTANT

## 2025-05-14 PROCEDURE — 6360000002 HC RX W HCPCS

## 2025-05-14 PROCEDURE — 85025 COMPLETE CBC W/AUTO DIFF WBC: CPT

## 2025-05-14 PROCEDURE — 96375 TX/PRO/DX INJ NEW DRUG ADDON: CPT

## 2025-05-14 PROCEDURE — 36415 COLL VENOUS BLD VENIPUNCTURE: CPT

## 2025-05-14 PROCEDURE — 83690 ASSAY OF LIPASE: CPT

## 2025-05-14 RX ORDER — POTASSIUM CHLORIDE 1500 MG/1
40 TABLET, EXTENDED RELEASE ORAL ONCE
Status: DISCONTINUED | OUTPATIENT
Start: 2025-05-14 | End: 2025-05-14

## 2025-05-14 RX ORDER — SODIUM CHLORIDE, SODIUM LACTATE, POTASSIUM CHLORIDE, AND CALCIUM CHLORIDE .6; .31; .03; .02 G/100ML; G/100ML; G/100ML; G/100ML
1000 INJECTION, SOLUTION INTRAVENOUS ONCE
Status: COMPLETED | OUTPATIENT
Start: 2025-05-14 | End: 2025-05-14

## 2025-05-14 RX ORDER — ONDANSETRON 2 MG/ML
8 INJECTION INTRAMUSCULAR; INTRAVENOUS ONCE
Status: COMPLETED | OUTPATIENT
Start: 2025-05-14 | End: 2025-05-14

## 2025-05-14 RX ORDER — POTASSIUM CHLORIDE 7.45 MG/ML
10 INJECTION INTRAVENOUS ONCE
Status: COMPLETED | OUTPATIENT
Start: 2025-05-14 | End: 2025-05-14

## 2025-05-14 RX ORDER — MAGNESIUM SULFATE IN WATER 40 MG/ML
2000 INJECTION, SOLUTION INTRAVENOUS ONCE
Status: COMPLETED | OUTPATIENT
Start: 2025-05-14 | End: 2025-05-14

## 2025-05-14 RX ADMIN — MAGNESIUM SULFATE HEPTAHYDRATE 2000 MG: 40 INJECTION, SOLUTION INTRAVENOUS at 18:26

## 2025-05-14 RX ADMIN — ONDANSETRON 8 MG: 2 INJECTION, SOLUTION INTRAMUSCULAR; INTRAVENOUS at 16:48

## 2025-05-14 RX ADMIN — POTASSIUM CHLORIDE 10 MEQ: 7.46 INJECTION, SOLUTION INTRAVENOUS at 20:33

## 2025-05-14 RX ADMIN — SODIUM CHLORIDE, SODIUM LACTATE, POTASSIUM CHLORIDE, AND CALCIUM CHLORIDE 1000 ML: .6; .31; .03; .02 INJECTION, SOLUTION INTRAVENOUS at 16:49

## 2025-05-14 RX ADMIN — POTASSIUM BICARBONATE 40 MEQ: 782 TABLET, EFFERVESCENT ORAL at 20:30

## 2025-05-14 ASSESSMENT — PAIN - FUNCTIONAL ASSESSMENT: PAIN_FUNCTIONAL_ASSESSMENT: NONE - DENIES PAIN

## 2025-05-14 NOTE — ED PROVIDER NOTES
THE [facility]  EMERGENCY DEPARTMENT ENCOUNTER          PHYSICIAN ASSISTANT NOTE     Date of evaluation: 5/14/2025    ADDENDUM:      Care of this patient was assumed from  Dr Maier.  The patient was seen for Fatigue  .  The patient's initial evaluation and plan have been discussed with the prior provider who initially evaluated the patient.  Nursing Notes, Past Medical Hx, Past Surgical Hx, Social Hx, Allergies, and Family Hx were all reviewed.    Diagnostic Results       RADIOLOGY:  XR CHEST (2 VW)    (Results Pending)       LABS:   No results found for this visit on 05/14/25.    RECENT VITALS:  BP: 130/73, Temp: 98 °F (36.7 °C), Pulse: 84, Respirations: 18, SpO2: 100 %     Procedures         ED Course     ED Course as of 05/14/25 1751   Wed May 14, 2025   1625 Patient is normotensive, afebrile, nontachycardic, nontachypneic on room air at this time.  Patient presents with fatigue likely due to decreased p.o. intake after EGD.  Will perform broad lab workup to rule out other acute intra-abdominal pathology, specifically electrolytes or starvation ketosis in the setting of decreased nutrition.  Will also obtain EKG and cardiac workup to evaluate for secondary causes of weakness.  Symptomatic management with Zofran and will p.o. challenge prior to disposition.  Fluids for volume expansion. [LB]   1716 Patient signed out to oncoming provider. [LB]      ED Course User Index  [LB] Anam Maier MD       The patient was given the following medications:  Orders Placed This Encounter   Medications    ondansetron (ZOFRAN) injection 8 mg    lactated ringers bolus 1,000 mL       CONSULTS:  None    MEDICAL DECISION MAKING / ASSESSMENT / PLAN     Zulya Louis is a 64 y.o. female here with nausea, vomiting, fatigue, decreased p.o. intake ongoing over the past couple of months.  She denies other symptoms.  Patient was administered 1 L LR here in the emergency department with some concern for dehydration        At the

## 2025-05-14 NOTE — ED PROVIDER NOTES
ED Attending Attestation Note     Date of evaluation: 5/14/2025    This patient was seen by the advance practice provider.  I have seen and examined the patient, agree with the workup, evaluation, management and diagnosis. The care plan has been discussed.  I have reviewed the ECG and concur with the JOHANNE's interpretation.  My assessment reveals patient presents with a recently performed EGD with unremarkable findings who comes in with some nausea and vomiting and fatigue.  On exam patient in no acute distress.  She had a unremarkable CBC and a BMP that showed hypokalemia hypomagnesemia.  Elected to replenish intravenously the magnesium and oral p.o.  She received IV magnesium and oral potassium supplementation.  Troponin not uptrending.  Chest x-ray was negative.     Moises Oconnell MD  05/14/25 [id number]

## 2025-05-14 NOTE — ED PROVIDER NOTES
THE [facility]  EMERGENCY DEPARTMENT ENCOUNTER          EM RESIDENT NOTE       Date of evaluation: 5/14/2025    Chief Complaint     Fatigue    History of Present Illness     Zulay Louis is a 64 y.o. female with PMH of coronary artery disease, essential hypertension, depression, anxiety disorder who presents with n/v, weakness.    Patient presents with worsening fatigue over the last couple months in the setting of intractable nausea and vomiting, starting after EGD performed 3/19/2025 to obtain biopsy which was ultimately unremarkable.  Since procedure, has been unable to tolerate p.o.  Patient has not been take anything for nausea.  Denies any additional abdominal surgeries.  Denies any abdominal pain, shortness of breath, chest pain.  Denies any bloody stools or hematemesis.  Denies any infectious symptoms; afebrile, no cough, normal bowel movements and urination.      The patient denies any other aggravating or alleviating factors unless otherwise explicitly stated above.    MEDICAL DECISION MAKING / ASSESSMENT / PLAN     INITIAL VITALS: BP: 130/73, Temp: 98 °F (36.7 °C), Pulse: 84, Respirations: 18, SpO2: 100 %    Zulay Louis is a 64 y.o. female with a history and presentation as described above in HPI. This patient was also evaluated by the attending physician. All care plans were discussed and agreed upon.    ED Course as of 05/14/25 1716   Wed May 14, 2025   1625 Patient is normotensive, afebrile, nontachycardic, nontachypneic on room air at this time.  Patient presents with fatigue likely due to decreased p.o. intake after EGD.  Will perform broad lab workup to rule out other acute intra-abdominal pathology, specifically electrolytes or starvation ketosis in the setting of decreased nutrition.  Will also obtain EKG and cardiac workup to evaluate for secondary causes of weakness.  Symptomatic management with Zofran and will p.o. challenge prior to disposition.  Fluids for volume expansion.

## 2025-05-14 NOTE — ED NOTES
Pt arrived with c/o generalized fatigue and poor oral intake. Pt states she recently had her esophagus stretched but since has still been unable to tolerate PO. Pt reporting emesis following food intake and liquid oral intake. Pt reports sometimes she can keep her PO medications day. Pt denies any pain at this time. Pt reports still having normal urinary and bowel habits at home too. Vital stable.     Chastity Schaeffer, RN  05/14/25 [id number]

## 2025-05-15 NOTE — DISCHARGE INSTRUCTIONS
Return to the emergency department with return of symptoms, inability to eat or drink, other concerning symptoms.

## (undated) DEVICE — CATHETER DIL 6FR L180CM BLLN INFLATED 36-40.5-45FR L8CM ES

## (undated) DEVICE — FORCEPS BX L240CM WRK CHN 2.8MM STD CAP W/ NDL MIC MESH

## (undated) DEVICE — SYRINGE INFL 60ML DISP ALLIANCE II

## (undated) DEVICE — CANNULA SAMP CO2 AD GRN 7FT CO2 AND 7FT O2 TBNG UNIV CONN